# Patient Record
Sex: FEMALE | Race: BLACK OR AFRICAN AMERICAN | NOT HISPANIC OR LATINO | Employment: OTHER | URBAN - METROPOLITAN AREA
[De-identification: names, ages, dates, MRNs, and addresses within clinical notes are randomized per-mention and may not be internally consistent; named-entity substitution may affect disease eponyms.]

---

## 2023-03-16 PROBLEM — M54.50 CHRONIC RIGHT-SIDED LOW BACK PAIN WITHOUT SCIATICA: Status: ACTIVE | Noted: 2023-03-16

## 2023-03-16 PROBLEM — N89.8 VAGINAL DISCHARGE: Status: ACTIVE | Noted: 2023-03-16

## 2023-03-16 PROBLEM — R73.09 ELEVATED HEMOGLOBIN A1C: Status: ACTIVE | Noted: 2023-03-16

## 2023-03-16 PROBLEM — M51.16 DISPLACEMENT OF LUMBAR INTERVERTEBRAL DISC WITH RADICULOPATHY: Status: ACTIVE | Noted: 2023-03-16

## 2023-03-16 PROBLEM — G89.29 CHRONIC RIGHT-SIDED LOW BACK PAIN WITHOUT SCIATICA: Status: ACTIVE | Noted: 2023-03-16

## 2023-03-16 PROBLEM — G89.29 CHRONIC LOW BACK PAIN: Status: ACTIVE | Noted: 2023-03-16

## 2023-03-16 PROBLEM — E55.9 VITAMIN D DEFICIENCY: Status: ACTIVE | Noted: 2023-03-16

## 2023-03-16 PROBLEM — L73.2 HIDRADENITIS SUPPURATIVA: Status: ACTIVE | Noted: 2023-03-16

## 2023-03-16 PROBLEM — E66.01 OBESITY, CLASS III, BMI 40-49.9 (MORBID OBESITY) (MULTI): Status: ACTIVE | Noted: 2023-03-16

## 2023-03-16 PROBLEM — M54.50 LUMBAR SPINE PAIN: Status: ACTIVE | Noted: 2023-03-16

## 2023-03-16 PROBLEM — Z98.891 HISTORY OF 2 CESAREAN SECTIONS: Status: ACTIVE | Noted: 2023-03-16

## 2023-03-16 PROBLEM — M54.59 OTHER LOW BACK PAIN: Status: ACTIVE | Noted: 2023-03-16

## 2023-03-16 PROBLEM — E66.813 OBESITY, CLASS III, BMI 40-49.9 (MORBID OBESITY): Status: ACTIVE | Noted: 2023-03-16

## 2023-03-16 PROBLEM — E78.5 HYPERLIPIDEMIA: Status: ACTIVE | Noted: 2023-03-16

## 2023-03-16 PROBLEM — N63.0 BREAST LUMP IN FEMALE: Status: ACTIVE | Noted: 2023-03-16

## 2023-03-16 PROBLEM — M54.2 CERVICAL SPINE PAIN: Status: ACTIVE | Noted: 2023-03-16

## 2023-03-16 PROBLEM — M54.50 CHRONIC LOW BACK PAIN: Status: ACTIVE | Noted: 2023-03-16

## 2023-03-16 PROBLEM — Z04.9 CONDITION NOT FOUND: Status: ACTIVE | Noted: 2023-03-16

## 2023-03-16 PROBLEM — M25.561 RIGHT KNEE PAIN: Status: ACTIVE | Noted: 2023-03-16

## 2023-03-16 PROBLEM — O34.219 UTERINE SCAR FROM PREVIOUS CESAREAN DELIVERY COMPLICATING PREGNANCY (HHS-HCC): Status: ACTIVE | Noted: 2023-03-16

## 2023-03-16 PROBLEM — N92.6 MISSED MENSES: Status: ACTIVE | Noted: 2023-03-16

## 2023-03-16 PROBLEM — O09.40 HIGH RISK MULTIGRAVIDA (HHS-HCC): Status: ACTIVE | Noted: 2023-03-16

## 2023-03-16 PROBLEM — R39.9 SYMPTOMS INVOLVING URINARY SYSTEM: Status: ACTIVE | Noted: 2023-03-16

## 2023-03-16 PROBLEM — N89.8 VAGINAL ODOR: Status: ACTIVE | Noted: 2023-03-16

## 2023-03-16 PROBLEM — M54.6 THORACIC SPINE PAIN: Status: ACTIVE | Noted: 2023-03-16

## 2023-03-16 RX ORDER — CLOTRIMAZOLE AND BETAMETHASONE DIPROPIONATE 10; .64 MG/G; MG/G
CREAM TOPICAL 2 TIMES DAILY
COMMUNITY
Start: 2022-04-28 | End: 2023-10-03 | Stop reason: ALTCHOICE

## 2023-03-16 RX ORDER — IBUPROFEN 800 MG/1
1 TABLET ORAL 3 TIMES DAILY PRN
COMMUNITY
Start: 2022-09-30 | End: 2024-06-03 | Stop reason: SDUPTHER

## 2023-03-16 RX ORDER — GABAPENTIN 300 MG/1
1 CAPSULE ORAL 2 TIMES DAILY
COMMUNITY
Start: 2022-08-08 | End: 2023-03-20 | Stop reason: SDUPTHER

## 2023-03-16 RX ORDER — FUROSEMIDE 20 MG/1
1 TABLET ORAL 2 TIMES DAILY
COMMUNITY
Start: 2022-04-08 | End: 2023-10-03 | Stop reason: ALTCHOICE

## 2023-03-16 RX ORDER — PNV NO.95/FERROUS FUM/FOLIC AC 28MG-0.8MG
1 TABLET ORAL DAILY
COMMUNITY
Start: 2021-08-27 | End: 2023-10-30 | Stop reason: ALTCHOICE

## 2023-03-16 RX ORDER — ERGOCALCIFEROL 1.25 MG/1
50000 CAPSULE ORAL
COMMUNITY
Start: 2021-09-28 | End: 2023-10-03 | Stop reason: SDUPTHER

## 2023-03-16 RX ORDER — NORETHINDRONE 0.35 MG/1
1 TABLET ORAL DAILY
COMMUNITY
Start: 2022-04-08 | End: 2023-10-30 | Stop reason: ALTCHOICE

## 2023-03-20 ENCOUNTER — OFFICE VISIT (OUTPATIENT)
Dept: PRIMARY CARE | Facility: CLINIC | Age: 28
End: 2023-03-20
Payer: COMMERCIAL

## 2023-03-20 VITALS
HEIGHT: 69 IN | HEART RATE: 70 BPM | WEIGHT: 293 LBS | SYSTOLIC BLOOD PRESSURE: 114 MMHG | BODY MASS INDEX: 43.4 KG/M2 | DIASTOLIC BLOOD PRESSURE: 75 MMHG

## 2023-03-20 DIAGNOSIS — G89.29 CHRONIC BILATERAL LOW BACK PAIN WITHOUT SCIATICA: ICD-10-CM

## 2023-03-20 DIAGNOSIS — M54.50 CHRONIC BILATERAL LOW BACK PAIN WITHOUT SCIATICA: ICD-10-CM

## 2023-03-20 DIAGNOSIS — E66.01 OBESITY, CLASS III, BMI 40-49.9 (MORBID OBESITY) (MULTI): Primary | ICD-10-CM

## 2023-03-20 PROCEDURE — 84436 ASSAY OF TOTAL THYROXINE: CPT

## 2023-03-20 PROCEDURE — 99214 OFFICE O/P EST MOD 30 MIN: CPT | Performed by: NURSE PRACTITIONER

## 2023-03-20 PROCEDURE — 80349 CANNABINOIDS NATURAL: CPT

## 2023-03-20 PROCEDURE — 80368 SEDATIVE HYPNOTICS: CPT

## 2023-03-20 PROCEDURE — 86376 MICROSOMAL ANTIBODY EACH: CPT

## 2023-03-20 PROCEDURE — 36415 COLL VENOUS BLD VENIPUNCTURE: CPT | Performed by: NURSE PRACTITIONER

## 2023-03-20 PROCEDURE — 80358 DRUG SCREENING METHADONE: CPT

## 2023-03-20 PROCEDURE — 80361 OPIATES 1 OR MORE: CPT

## 2023-03-20 PROCEDURE — 80354 DRUG SCREENING FENTANYL: CPT

## 2023-03-20 PROCEDURE — 84443 ASSAY THYROID STIM HORMONE: CPT

## 2023-03-20 PROCEDURE — 80373 DRUG SCREENING TRAMADOL: CPT

## 2023-03-20 PROCEDURE — 80346 BENZODIAZEPINES1-12: CPT

## 2023-03-20 PROCEDURE — 80307 DRUG TEST PRSMV CHEM ANLYZR: CPT

## 2023-03-20 PROCEDURE — 1036F TOBACCO NON-USER: CPT | Performed by: NURSE PRACTITIONER

## 2023-03-20 PROCEDURE — 80365 DRUG SCREENING OXYCODONE: CPT

## 2023-03-20 RX ORDER — PHENTERMINE HYDROCHLORIDE 37.5 MG/1
37.5 CAPSULE ORAL
Qty: 30 CAPSULE | Refills: 0 | Status: SHIPPED | OUTPATIENT
Start: 2023-03-20 | End: 2023-10-30 | Stop reason: ALTCHOICE

## 2023-03-20 RX ORDER — TIZANIDINE 2 MG/1
2 TABLET ORAL NIGHTLY PRN
Qty: 20 TABLET | Refills: 0 | Status: SHIPPED | OUTPATIENT
Start: 2023-03-20 | End: 2023-10-30 | Stop reason: ALTCHOICE

## 2023-03-20 RX ORDER — TIZANIDINE HYDROCHLORIDE 2 MG/1
2 CAPSULE, GELATIN COATED ORAL NIGHTLY PRN
Qty: 20 CAPSULE | Refills: 0 | Status: SHIPPED | OUTPATIENT
Start: 2023-03-20 | End: 2023-03-20 | Stop reason: CLARIF

## 2023-03-20 RX ORDER — METHYLPREDNISOLONE 4 MG/1
TABLET ORAL
Qty: 21 TABLET | Refills: 0 | Status: SHIPPED | OUTPATIENT
Start: 2023-03-20 | End: 2023-03-27

## 2023-03-20 RX ORDER — GABAPENTIN 300 MG/1
300 CAPSULE ORAL 2 TIMES DAILY
Qty: 60 CAPSULE | Refills: 0 | Status: SHIPPED | OUTPATIENT
Start: 2023-03-20 | End: 2023-10-30 | Stop reason: ALTCHOICE

## 2023-03-20 NOTE — PROGRESS NOTES
"Subjective   Patient ID: Erza Cox is a 27 y.o. female who presents for Weight Loss (Pt complains of left flank pain from standing long periods of time ).    Ezra is a 27 year old female who presents to the office today for several concerns. She has been having pain in her back and left side. Denied dysuria or urinary frequency. No history of kidney stones. Has had chronic back pain. Needs [place] paperwork completed to continue lift restrictions. Has seen chiropractor for back pain.     Changed diet and unable to lose weight. Frustrated that her TSH wasn't drawn in January. TSH drawn in July, 2022 (result normal).          Review of Systems   All other systems reviewed and are negative.      Objective   /75   Pulse 70   Ht 1.753 m (5' 9\")   Wt (!) 152 kg (336 lb)   BMI 49.62 kg/m²     Physical Exam  Constitutional:       Appearance: Normal appearance.   HENT:      Head: Normocephalic and atraumatic.      Right Ear: Tympanic membrane normal.      Left Ear: Tympanic membrane normal.      Nose: Nose normal.      Mouth/Throat:      Mouth: Mucous membranes are moist.      Pharynx: Oropharynx is clear.   Eyes:      Extraocular Movements: Extraocular movements intact.      Conjunctiva/sclera: Conjunctivae normal.      Pupils: Pupils are equal, round, and reactive to light.   Cardiovascular:      Rate and Rhythm: Normal rate and regular rhythm.      Pulses: Normal pulses.      Heart sounds: Normal heart sounds.   Pulmonary:      Effort: Pulmonary effort is normal.      Breath sounds: Normal breath sounds.   Abdominal:      General: Abdomen is flat. Bowel sounds are normal.      Palpations: Abdomen is soft.   Genitourinary:     General: Normal vulva.   Musculoskeletal:      Cervical back: Normal range of motion and neck supple.      Comments: Decreased ROM lower spine with flexion and extension.   Skin:     General: Skin is warm and dry.      Capillary Refill: Capillary refill takes less than 2 seconds. "   Neurological:      General: No focal deficit present.      Mental Status: She is alert and oriented to person, place, and time. Mental status is at baseline.   Psychiatric:         Mood and Affect: Mood normal.         Behavior: Behavior normal.         Thought Content: Thought content normal.         Judgment: Judgment normal.         Assessment/Plan   Problem List Items Addressed This Visit          Endocrine/Metabolic    Obesity, Class III, BMI 40-49.9 (morbid obesity) (CMS/[facility]) - Primary    Relevant Medications    phentermine 37.5 mg capsule    Other Relevant Orders    Thyroid Peroxidase (TPO) Antibody (Completed)    TSH (Completed)    Thyroxine, Total (Completed)    Referral to Nutrition Services    Opiate/Opioid/Benzo Extended Prescription Compliance       Other    Chronic low back pain    Relevant Medications    methylPREDNISolone (Medrol Dospak) 4 mg tablets    gabapentin (Neurontin) 300 mg capsule    tiZANidine (Zanaflex) 2 mg tablet    Other Relevant Orders    Referral to Pain Medicine   Obesity: counseled on weight loss to improve health, cardiovascular health, decrease risk of diabetes and decrease wear and tear on weight bearing joints which will alleviate joint pain. Encouraged exercising such as starting to walk; 15-30 minutes daily, increasing goal of exercise to >150 minutes/week with emphasis on cardio, strengthening and toning. Discussed benefits of gym membership and/or . Cut back on daily calories to [id number] kcal diet. Watch daily carbs such as breads, pasta, rice, starchy vegetables and sweets. Started on Phentermine. Counseled on side effects of Phentermine: heart palpitations, elevated BP, insomnia, dizziness, dry mouth, headache or GI symptoms with Phentermine. OARRS checked. Counseled on filling script within 7 days of date script written to avoid problems with [place] [employer] bylaws on Phentermine. Follow-up in one month to recheck BP and weight.    Low back pain:  recommend contacting pain management for re-evaluation. You were prescribed Medrol dose cristina along with muscle relaxant. Avoid heavy lifting. [facility] paperwork will be completed.

## 2023-03-21 LAB
THYROPEROXIDASE AB (IU/ML) IN SER/PLAS: <28 IU/ML
THYROTROPIN (MIU/L) IN SER/PLAS BY DETECTION LIMIT <= 0.05 MIU/L: 0.69 MIU/L (ref 0.44–3.98)
THYROXINE (T4) (UG/DL) IN SER/PLAS: 7.4 UG/DL (ref 4.5–11.1)

## 2023-03-23 LAB
6-ACETYLMORPHINE: <25 NG/ML
7-AMINOCLONAZEPAM: <25 NG/ML
ALPHA-HYDROXYALPRAZOLAM: <25 NG/ML
ALPHA-HYDROXYMIDAZOLAM: <25 NG/ML
ALPRAZOLAM: <25 NG/ML
AMPHETAMINE (PRESENCE) IN URINE BY SCREEN METHOD: ABNORMAL
BARBITURATES PRESENCE IN URINE BY SCREEN METHOD: ABNORMAL
CANNABINOIDS IN URINE BY SCREEN METHOD: ABNORMAL
CHLORDIAZEPOXIDE: <25 NG/ML
CLONAZEPAM: <25 NG/ML
COCAINE (PRESENCE) IN URINE BY SCREEN METHOD: ABNORMAL
CODEINE: <50 NG/ML
CREATINE, URINE FOR DRUG: 185.4 MG/DL
DIAZEPAM: <25 NG/ML
DRUG SCREEN COMMENT URINE: ABNORMAL
EDDP: <25 NG/ML
FENTANYL CONFIRMATION, URINE: <2.5 NG/ML
HYDROCODONE: <25 NG/ML
HYDROMORPHONE: <25 NG/ML
LORAZEPAM: <25 NG/ML
METHADONE CONFIRMATION,URINE: <25 NG/ML
MIDAZOLAM: <25 NG/ML
MORPHINE URINE: <50 NG/ML
NORDIAZEPAM: <25 NG/ML
NORFENTANYL: <2.5 NG/ML
NORHYDROCODONE: <25 NG/ML
NOROXYCODONE: <25 NG/ML
O-DESMETHYLTRAMADOL: <50 NG/ML
OXAZEPAM: <25 NG/ML
OXYCODONE: <25 NG/ML
OXYMORPHONE: <25 NG/ML
PHENCYCLIDINE (PRESENCE) IN URINE BY SCREEN METHOD: ABNORMAL
TEMAZEPAM: <25 NG/ML
TRAMADOL: <50 NG/ML
ZOLPIDEM METABOLITE (ZCA): <25 NG/ML
ZOLPIDEM: <25 NG/ML

## 2023-03-23 RX ORDER — TIZANIDINE HYDROCHLORIDE 2 MG/1
2 CAPSULE, GELATIN COATED ORAL NIGHTLY PRN
Qty: 20 CAPSULE | Refills: 0 | OUTPATIENT
Start: 2023-03-23 | End: 2023-04-12

## 2023-03-25 ENCOUNTER — DOCUMENTATION (OUTPATIENT)
Dept: PRIMARY CARE | Facility: CLINIC | Age: 28
End: 2023-03-25
Payer: COMMERCIAL

## 2023-03-25 LAB — 11-NOR-9-CARBOXY-THC, URN, QUANT: 35 NG/ML

## 2023-03-25 NOTE — PROGRESS NOTES
Discussed patient's positive drug screen with her via telephone. She admitted that she takes gummies with THC.

## 2023-05-02 LAB — URINE CULTURE: NORMAL

## 2023-07-10 ENCOUNTER — TELEPHONE (OUTPATIENT)
Dept: PRIMARY CARE | Facility: CLINIC | Age: 28
End: 2023-07-10
Payer: COMMERCIAL

## 2023-08-08 LAB — URINE CULTURE: NORMAL

## 2023-10-01 PROBLEM — L81.0 POSTINFLAMMATORY HYPERPIGMENTATION: Status: ACTIVE | Noted: 2021-02-01

## 2023-10-01 PROBLEM — R29.818 SUSPECTED SLEEP APNEA: Status: ACTIVE | Noted: 2023-10-01

## 2023-10-01 PROBLEM — L70.0 ACNE VULGARIS: Status: ACTIVE | Noted: 2021-02-01

## 2023-10-01 PROBLEM — L57.9 SKIN CHANGES DUE TO CHRONIC EXPOSURE TO NONIONIZING RADIATION, UNSPECIFIED: Status: ACTIVE | Noted: 2021-02-01

## 2023-10-01 PROBLEM — Z55.6 DIFFICULTY DEMONSTRATING HEALTH LITERACY: Status: ACTIVE | Noted: 2023-10-01

## 2023-10-03 ENCOUNTER — OFFICE VISIT (OUTPATIENT)
Dept: PRIMARY CARE | Facility: CLINIC | Age: 28
End: 2023-10-03
Payer: COMMERCIAL

## 2023-10-03 VITALS — HEIGHT: 69 IN | BODY MASS INDEX: 43.4 KG/M2 | WEIGHT: 293 LBS

## 2023-10-03 DIAGNOSIS — M54.50 LUMBAR SPINE PAIN: Primary | ICD-10-CM

## 2023-10-03 DIAGNOSIS — E55.9 VITAMIN D DEFICIENCY: ICD-10-CM

## 2023-10-03 PROCEDURE — 1036F TOBACCO NON-USER: CPT | Performed by: FAMILY MEDICINE

## 2023-10-03 PROCEDURE — 99214 OFFICE O/P EST MOD 30 MIN: CPT | Performed by: FAMILY MEDICINE

## 2023-10-03 ASSESSMENT — ENCOUNTER SYMPTOMS
LOSS OF SENSATION IN FEET: 0
DEPRESSION: 0
OCCASIONAL FEELINGS OF UNSTEADINESS: 0

## 2023-10-03 ASSESSMENT — PATIENT HEALTH QUESTIONNAIRE - PHQ9
1. LITTLE INTEREST OR PLEASURE IN DOING THINGS: NOT AT ALL
2. FEELING DOWN, DEPRESSED OR HOPELESS: NOT AT ALL
SUM OF ALL RESPONSES TO PHQ9 QUESTIONS 1 AND 2: 0

## 2023-10-07 RX ORDER — ERGOCALCIFEROL 1.25 MG/1
50000 CAPSULE ORAL
Qty: 90 CAPSULE | Refills: 1 | Status: SHIPPED | OUTPATIENT
Start: 2023-10-07 | End: 2023-10-30 | Stop reason: SDUPTHER

## 2023-10-07 ASSESSMENT — ENCOUNTER SYMPTOMS
EYES NEGATIVE: 1
RESPIRATORY NEGATIVE: 1
PSYCHIATRIC NEGATIVE: 1
CONSTITUTIONAL NEGATIVE: 1
ENDOCRINE NEGATIVE: 1
CARDIOVASCULAR NEGATIVE: 1
MUSCULOSKELETAL NEGATIVE: 1
GASTROINTESTINAL NEGATIVE: 1
NEUROLOGICAL NEGATIVE: 1

## 2023-10-07 NOTE — ASSESSMENT & PLAN NOTE
Patient has been off work since March unclear what kind of care patient has had for her back pain since then did not return for follow-up to this office is not taking any medication now states she is ready to return to work was supposed to return to work on September 20 unclear why she did not schedule an appointment to see her PCP prior to the return to work date this provider is not comfortable completing any FMLA related documentation for the patient as this provider has not been involved in her care in the past and there are no records available to support her pain over the last 6 months patient was advised that this provider is not able to complete her forms.  35 minutes spent reviewing notes forms discussing with staff and discussing with patient

## 2023-10-07 NOTE — PROGRESS NOTES
Subjective   Patient ID: Ezra Cox is a 28 y.o. female who presents for No chief complaint on file..      HPI patient is here to get forms filled out for [employer] has been off work since March and was given time off from March to September 20 for back pain with the plan to follow-up did not return for follow-up during that time.  Has not returned to work as planned on September 20 wants forms completed to state that she was allowed to be off work from the 20th to today wants to return to work now.  Was started on phentermine in March did not return for follow-up has not been seen pain management or doing therapy  Current Outpatient Medications on File Prior to Visit   Medication Sig Dispense Refill    gabapentin (Neurontin) 300 mg capsule Take 1 capsule (300 mg) by mouth in the morning and 1 capsule (300 mg) before bedtime. 60 capsule 0    ibuprofen 800 mg tablet Take 1 tablet (800 mg) by mouth 3 times a day as needed.      norethindrone (Micronor) 0.35 mg tablet Take 1 tablet (0.35 mg) by mouth once daily.      phentermine 37.5 mg capsule Take 1 capsule (37.5 mg) by mouth once daily in the morning. Take before meals. 30 capsule 0    prenatal vit no.124-iron-folic (Prenatal Vitamin) 27 mg iron- 800 mcg tablet Take 1 tablet by mouth once daily.      tiZANidine (Zanaflex) 2 mg tablet Take 1 tablet (2 mg) by mouth as needed at bedtime for muscle spasms for up to 10 days. 20 tablet 0    [DISCONTINUED] clotrimazole-betamethasone (Lotrisone) cream twice a day. APPLY SPARINGLY TO THE AFFECTED AREA(S)      [DISCONTINUED] ergocalciferol (Vitamin D-2) 1.25 MG (37908 UT) capsule Take 1 capsule (50,000 Units) by mouth 1 (one) time per week. for 6 months. Then switch to OTC vitamin D 2,000 untzane adhikari.      [DISCONTINUED] furosemide (Lasix) 20 mg tablet Take 1 tablet (20 mg) by mouth 2 times a day.       No current facility-administered medications on file prior to visit.        Review of Systems   Constitutional: Negative.   "  HENT: Negative.     Eyes: Negative.    Respiratory: Negative.     Cardiovascular: Negative.    Gastrointestinal: Negative.    Endocrine: Negative.    Genitourinary: Negative.    Musculoskeletal: Negative.    Neurological: Negative.    Psychiatric/Behavioral: Negative.         Objective   Height 1.753 m (5' 9\")   Weight 145 kg (319 lb)   Body Mass Index 47.11 kg/m²   BSA: 2.66 meters squared  Growth percentiles: Facility age limit for growth %jerry is 20 years. Facility age limit for growth %jerry is 20 years.   No visits with results within 1 Week(s) from this visit.   Latest known visit with results is:   Orders Only on 08/07/2023   Component Date Value Ref Range Status    Urine Culture 08/07/2023 NO SIGNIFICANT GROWTH.   Final      Physical Exam  Constitutional:       General: She is not in acute distress.     Appearance: She is obese. She is not ill-appearing.   Neurological:      Mental Status: She is alert.         Assessment/Plan   Problem List Items Addressed This Visit             ICD-10-CM    Lumbar spine pain - Primary M54.50     Patient has been off work since March unclear what kind of care patient has had for her back pain since then did not return for follow-up to this office is not taking any medication now states she is ready to return to work was supposed to return to work on September 20 unclear why she did not schedule an appointment to see her PCP prior to the return to work date this provider is not comfortable completing any FMLA related documentation for the patient as this provider has not been involved in her care in the past and there are no records available to support her pain over the last 6 months patient was advised that this provider is not able to complete her forms.  35 minutes spent reviewing notes forms discussing with staff and discussing with patient         Vitamin D deficiency E55.9    Relevant Medications    ergocalciferol (Vitamin D-2) 1.25 MG (02265 UT) capsule            "

## 2023-10-16 ENCOUNTER — APPOINTMENT (OUTPATIENT)
Dept: OBSTETRICS AND GYNECOLOGY | Facility: CLINIC | Age: 28
End: 2023-10-16
Payer: COMMERCIAL

## 2023-10-30 ENCOUNTER — OFFICE VISIT (OUTPATIENT)
Dept: OBSTETRICS AND GYNECOLOGY | Facility: CLINIC | Age: 28
End: 2023-10-30
Payer: COMMERCIAL

## 2023-10-30 VITALS
HEIGHT: 69 IN | SYSTOLIC BLOOD PRESSURE: 116 MMHG | DIASTOLIC BLOOD PRESSURE: 60 MMHG | BODY MASS INDEX: 43.4 KG/M2 | WEIGHT: 293 LBS

## 2023-10-30 DIAGNOSIS — Z01.419 ENCOUNTER FOR ANNUAL ROUTINE GYNECOLOGICAL EXAMINATION: ICD-10-CM

## 2023-10-30 DIAGNOSIS — E55.9 VITAMIN D DEFICIENCY: ICD-10-CM

## 2023-10-30 LAB
POC BLOOD, URINE: ABNORMAL
POC GLUCOSE, URINE: NEGATIVE MG/DL
POC KETONES, URINE: NEGATIVE MG/DL
POC LEUKOCYTES, URINE: NEGATIVE
POC NITRITE,URINE: NEGATIVE
POC PROTEIN, URINE: NEGATIVE MG/DL

## 2023-10-30 PROCEDURE — 1036F TOBACCO NON-USER: CPT | Performed by: OBSTETRICS & GYNECOLOGY

## 2023-10-30 PROCEDURE — 99395 PREV VISIT EST AGE 18-39: CPT | Performed by: OBSTETRICS & GYNECOLOGY

## 2023-10-30 PROCEDURE — 81003 URINALYSIS AUTO W/O SCOPE: CPT | Performed by: OBSTETRICS & GYNECOLOGY

## 2023-10-30 RX ORDER — ERGOCALCIFEROL 1.25 MG/1
50000 CAPSULE ORAL
Qty: 12 CAPSULE | Refills: 1 | Status: SHIPPED | OUTPATIENT
Start: 2023-10-30

## 2023-10-30 ASSESSMENT — PAIN SCALES - GENERAL: PAINLEVEL: 0-NO PAIN

## 2023-10-30 NOTE — PROGRESS NOTES
Subjective   Patient ID: Ezra Cox is a 28 y.o. female who presents for Gynecologic Exam (Annual exam, last pap 7/15/21 wnl. No chaperone needed).  Gynecologic Exam      Patient is a 28-year-old female  4 para 3 well-known to the practice here for her annual exam.    She does get occasional cyst on her inner thighs.  And rarely under her arms.  She was and told that she has suppurative hidradenitis.  No current flares at this time.    She denies any urinary or bowel symptoms.    She is rarely sexually active and is aware of emergency contraception.  Does not want contraception at this time.    She had a history of postpartum hemorrhage and we will check her CBC  Review of Systems   All other systems reviewed and are negative.      Objective   Physical Exam  Thyroid: No thyroid megaly    Cardiovascular: Regular rate and rhythm    Lungs: Clear to auscultation    Breasts: No skin changes no masses palpated    Abdomen: Soft nontender bowel sounds positive no masses palpated    Extremities nontender no edema    Pelvic exam: External genitalia [place]'s urethra and [place]'s are normal.  Vaginal exam shows no lesions or discharge.  Pelvic bimanual exam reveals no masses or tenderness.  Assessment/Plan   Normal annual exam.  No obvious fibroids.    Pap smear due next year    CBC to check anemia status since her postpartum hemorrhage over a year ago    Refill her vitamin D    Follow-up in 1 year or as needed

## 2024-01-03 ENCOUNTER — OFFICE VISIT (OUTPATIENT)
Dept: PRIMARY CARE | Facility: CLINIC | Age: 29
End: 2024-01-03
Payer: COMMERCIAL

## 2024-01-03 VITALS
BODY MASS INDEX: 43.4 KG/M2 | HEIGHT: 69 IN | WEIGHT: 293 LBS | SYSTOLIC BLOOD PRESSURE: 126 MMHG | DIASTOLIC BLOOD PRESSURE: 79 MMHG | HEART RATE: 64 BPM

## 2024-01-03 DIAGNOSIS — R09.81 NASAL CONGESTION: ICD-10-CM

## 2024-01-03 DIAGNOSIS — D64.9 LOW HEMOGLOBIN: ICD-10-CM

## 2024-01-03 DIAGNOSIS — L98.9 SKIN LESION OF BACK: ICD-10-CM

## 2024-01-03 DIAGNOSIS — Z00.00 ANNUAL PHYSICAL EXAM: Primary | ICD-10-CM

## 2024-01-03 DIAGNOSIS — E78.00 PURE HYPERCHOLESTEROLEMIA: ICD-10-CM

## 2024-01-03 DIAGNOSIS — J02.9 SORE THROAT: ICD-10-CM

## 2024-01-03 DIAGNOSIS — H65.192 ACUTE OTITIS MEDIA WITH EFFUSION OF LEFT EAR: ICD-10-CM

## 2024-01-03 DIAGNOSIS — E55.9 VITAMIN D DEFICIENCY: ICD-10-CM

## 2024-01-03 DIAGNOSIS — H61.21 IMPACTED CERUMEN OF RIGHT EAR: ICD-10-CM

## 2024-01-03 LAB
25(OH)D3 SERPL-MCNC: 26 NG/ML (ref 30–100)
ALBUMIN SERPL BCP-MCNC: 4.4 G/DL (ref 3.4–5)
ALP SERPL-CCNC: 68 U/L (ref 33–110)
ALT SERPL W P-5'-P-CCNC: 11 U/L (ref 7–45)
ANION GAP SERPL CALC-SCNC: 15 MMOL/L (ref 10–20)
AST SERPL W P-5'-P-CCNC: 18 U/L (ref 9–39)
BASOPHILS # BLD AUTO: 0.04 X10*3/UL (ref 0–0.1)
BASOPHILS NFR BLD AUTO: 0.5 %
BILIRUB SERPL-MCNC: 0.6 MG/DL (ref 0–1.2)
BUN SERPL-MCNC: 9 MG/DL (ref 6–23)
CALCIUM SERPL-MCNC: 9.6 MG/DL (ref 8.6–10.6)
CHLORIDE SERPL-SCNC: 103 MMOL/L (ref 98–107)
CHOLEST SERPL-MCNC: 257 MG/DL (ref 0–199)
CHOLESTEROL/HDL RATIO: 3
CO2 SERPL-SCNC: 24 MMOL/L (ref 21–32)
CREAT SERPL-MCNC: 0.78 MG/DL (ref 0.5–1.05)
EOSINOPHIL # BLD AUTO: 0.21 X10*3/UL (ref 0–0.7)
EOSINOPHIL NFR BLD AUTO: 2.8 %
ERYTHROCYTE [DISTWIDTH] IN BLOOD BY AUTOMATED COUNT: 17.3 % (ref 11.5–14.5)
GFR SERPL CREATININE-BSD FRML MDRD: >90 ML/MIN/1.73M*2
GLUCOSE SERPL-MCNC: 82 MG/DL (ref 74–99)
HCT VFR BLD AUTO: 37.6 % (ref 36–46)
HDLC SERPL-MCNC: 85 MG/DL
HGB BLD-MCNC: 11.3 G/DL (ref 12–16)
IMM GRANULOCYTES # BLD AUTO: 0.03 X10*3/UL (ref 0–0.7)
IMM GRANULOCYTES NFR BLD AUTO: 0.4 % (ref 0–0.9)
LDLC SERPL CALC-MCNC: 159 MG/DL
LYMPHOCYTES # BLD AUTO: 2.15 X10*3/UL (ref 1.2–4.8)
LYMPHOCYTES NFR BLD AUTO: 29.1 %
MCH RBC QN AUTO: 22.4 PG (ref 26–34)
MCHC RBC AUTO-ENTMCNC: 30.1 G/DL (ref 32–36)
MCV RBC AUTO: 75 FL (ref 80–100)
MONOCYTES # BLD AUTO: 0.64 X10*3/UL (ref 0.1–1)
MONOCYTES NFR BLD AUTO: 8.6 %
NEUTROPHILS # BLD AUTO: 4.33 X10*3/UL (ref 1.2–7.7)
NEUTROPHILS NFR BLD AUTO: 58.6 %
NON HDL CHOLESTEROL: 172 MG/DL (ref 0–149)
NRBC BLD-RTO: 0 /100 WBCS (ref 0–0)
PLATELET # BLD AUTO: 300 X10*3/UL (ref 150–450)
POC RAPID STREP: NEGATIVE
POTASSIUM SERPL-SCNC: 4 MMOL/L (ref 3.5–5.3)
PROT SERPL-MCNC: 7.9 G/DL (ref 6.4–8.2)
RBC # BLD AUTO: 5.05 X10*6/UL (ref 4–5.2)
SODIUM SERPL-SCNC: 138 MMOL/L (ref 136–145)
TRIGL SERPL-MCNC: 67 MG/DL (ref 0–149)
VLDL: 13 MG/DL (ref 0–40)
WBC # BLD AUTO: 7.4 X10*3/UL (ref 4.4–11.3)

## 2024-01-03 PROCEDURE — 99395 PREV VISIT EST AGE 18-39: CPT | Performed by: NURSE PRACTITIONER

## 2024-01-03 PROCEDURE — 36415 COLL VENOUS BLD VENIPUNCTURE: CPT

## 2024-01-03 PROCEDURE — 82306 VITAMIN D 25 HYDROXY: CPT

## 2024-01-03 PROCEDURE — 83540 ASSAY OF IRON: CPT

## 2024-01-03 PROCEDURE — 1036F TOBACCO NON-USER: CPT | Performed by: NURSE PRACTITIONER

## 2024-01-03 PROCEDURE — 99214 OFFICE O/P EST MOD 30 MIN: CPT | Performed by: NURSE PRACTITIONER

## 2024-01-03 PROCEDURE — 82728 ASSAY OF FERRITIN: CPT

## 2024-01-03 PROCEDURE — 80061 LIPID PANEL: CPT

## 2024-01-03 PROCEDURE — 83550 IRON BINDING TEST: CPT

## 2024-01-03 PROCEDURE — 87880 STREP A ASSAY W/OPTIC: CPT | Performed by: NURSE PRACTITIONER

## 2024-01-03 PROCEDURE — 80053 COMPREHEN METABOLIC PANEL: CPT

## 2024-01-03 PROCEDURE — 69210 REMOVE IMPACTED EAR WAX UNI: CPT | Performed by: NURSE PRACTITIONER

## 2024-01-03 PROCEDURE — 85025 COMPLETE CBC W/AUTO DIFF WBC: CPT

## 2024-01-03 PROCEDURE — 87637 SARSCOV2&INF A&B&RSV AMP PRB: CPT

## 2024-01-03 RX ORDER — CEFDINIR 300 MG/1
300 CAPSULE ORAL 2 TIMES DAILY
Qty: 14 CAPSULE | Refills: 0 | Status: SHIPPED | OUTPATIENT
Start: 2024-01-03 | End: 2024-01-10

## 2024-01-03 ASSESSMENT — PATIENT HEALTH QUESTIONNAIRE - PHQ9
2. FEELING DOWN, DEPRESSED OR HOPELESS: NOT AT ALL
SUM OF ALL RESPONSES TO PHQ9 QUESTIONS 1 AND 2: 0
1. LITTLE INTEREST OR PLEASURE IN DOING THINGS: NOT AT ALL

## 2024-01-03 ASSESSMENT — ENCOUNTER SYMPTOMS
DEPRESSION: 0
OCCASIONAL FEELINGS OF UNSTEADINESS: 0
LOSS OF SENSATION IN FEET: 0

## 2024-01-03 NOTE — PROGRESS NOTES
Reason for Visit: Annual Physical Exam    HPI: Ezra presents to the office today for a physical exam.    Left ear painful; throat sore. No fever, but has had chills. Has had pain in neck. Denied loss of hearing. No exposure to Covid, influenza or RSV.    Concern for skin lesion right back/bra line. Stated skin lesion has gotten larger. History of hidradenitis suppurativa.      Active Problem List  Patient Active Problem List   Diagnosis    Displacement of lumbar intervertebral disc with radiculopathy    Chronic low back pain    Chronic right-sided low back pain without sciatica    Hidradenitis suppurativa    Breast lump in female    Cervical spine pain    Elevated hemoglobin A1c    History of 2  sections    High risk multigravida    Hyperlipidemia    Missed menses    Lumbar spine pain    Condition not found    Obesity, Class III, BMI 40-49.9 (morbid obesity) (CMS/HCC)    Other low back pain    Right knee pain    Symptoms involving urinary system    Uterine scar from previous  delivery complicating pregnancy    Vaginal discharge    Thoracic spine pain    Vitamin D deficiency    Vaginal odor    Acne vulgaris    Difficulty demonstrating health literacy    Postinflammatory hyperpigmentation    Skin changes due to chronic exposure to nonionizing radiation, unspecified    Suspected sleep apnea       Comprehensive Medical/Surgical/Social/Family History  Past Medical History:   Diagnosis Date    Encounter for gynecological examination (general) (routine) without abnormal findings 2020    Well woman exam    Encounter for supervision of normal pregnancy, unspecified, second trimester 2021    Second trimester pregnancy    Personal history of urinary (tract) infections 10/03/2016    History of urinary tract infection    Unspecified abdominal pain 2018    Abdominal cramps     Past Surgical History:   Procedure Laterality Date     SECTION, CLASSIC  10/10/2016     Section  "    Social History     Social History Narrative    Not on file         Allergies and Medications  Patient has no known allergies.  Current Outpatient Medications on File Prior to Visit   Medication Sig Dispense Refill    ergocalciferol (Vitamin D-2) 1.25 MG (03512 UT) capsule Take 1 capsule (50,000 Units) by mouth 1 (one) time per week. for 6 months. Then switch to OTC vitamin D 2,000 untis dily. 12 capsule 1    ibuprofen 800 mg tablet Take 1 tablet (800 mg) by mouth 3 times a day as needed.       No current facility-administered medications on file prior to visit.         ROS otherwise negative aside from what was mentioned above in HPI.    Vitals  /79   Pulse 64   Ht 1.753 m (5' 9\")   Wt 149 kg (329 lb)   BMI 48.58 kg/m²   Body mass index is 48.58 kg/m².  Physical Exam  Gen: Alert, NAD  HEENT:  PERRLA, EOMI, conjunctiva and sclera normal in appearance. Left external ear canals with erythema. Left TM with hazy cone of light; Right ear with cerumen impaction. Oral cavity and posterior pharynx without lesions/exudate. Posterior pharynx with erythema.  Neck:  Supple with FROM; No masses/nodes palpable; Thyroid nontender and without nodules; No DOMITILA  Respiratory:  Lungs CTAB  Cardiovascular:  Heart RRR. No M/R/G. Peripheral pulses equal bilaterally  Abdomen:  Soft, nontender, BS present throughout; No R/G/R; No HSM or masses palpated  Extremities:  FROM all extremities; Muscle strength grossly normal with good tone  Neuro:  CN II-XII intact; Reflexes 2+/2+; Gross motor and sensory intact  Skin:  No suspicious lesions present. + skin lesion with discoloration to right mid back/flank. No tenderness. No discharge from lesion.  Breast: No masses, skin lesions or nipple discharges, no axillary lymphadenopathy    Assessment and Plan:  Problem List Items Addressed This Visit       Hyperlipidemia    Relevant Orders    Comprehensive Metabolic Panel (Completed)    Lipid Panel (Completed)    Vitamin D deficiency    " Relevant Orders    Vitamin D 25-Hydroxy,Total (for eval of Vitamin D levels) (Completed)     Other Visit Diagnoses       Annual physical exam    -  Primary    Skin lesion of back        Relevant Orders    Referral to Dermatology    Sore throat        Relevant Orders    POCT rapid strep A manually resulted (Completed)    CBC and Auto Differential (Completed)    Nasal congestion        Relevant Orders    Sars-CoV-2 and Influenza A/B PCR    RSV PCR    Acute otitis media with effusion of left ear        Relevant Medications    cefdinir (Omnicef) 300 mg capsule    Impacted cerumen of right ear            1) Sore throat: rapid strep negative. Recommend salt water gargles 3-4 times daily along with warm honey or lemon tea. May use Cepacol or Chloraseptic throat spray to soothe sore throat.    2) Left ear otitis media: you were prescribed Cefdinir for ear infection.     3) Right cerumen impaction: right ear flushed with warm soapy water. You tolerated well.     4) Hyperlipidemia: CMP and lipids ordered. Watch foods high in cholesterol (fried foods, fast food, processed meats, desserts such as cookies, cake, ice cream, pastries and other sweets). Some foods that are high in cholesterol are healthy additions to your diet (eggs, cheese, shellfish, pasture raised steak, organ meats such as heart, kidney and liver, sardines and full-fat yogurt.    5) Nasal congestion: nasopharyngeal swab collected for Covid, influenza and RSV.     6) Skin lesion to back: referral placed for dermatology for removal of skin lesion.

## 2024-01-04 DIAGNOSIS — E55.9 VITAMIN D DEFICIENCY: ICD-10-CM

## 2024-01-04 LAB
FERRITIN SERPL-MCNC: 12 NG/ML (ref 8–150)
FLUAV RNA RESP QL NAA+PROBE: NOT DETECTED
FLUBV RNA RESP QL NAA+PROBE: NOT DETECTED
IRON SATN MFR SERPL: ABNORMAL %
IRON SERPL-MCNC: 34 UG/DL (ref 35–150)
RSV RNA RESP QL NAA+PROBE: NOT DETECTED
SARS-COV-2 ORF1AB RESP QL NAA+PROBE: NOT DETECTED
TIBC SERPL-MCNC: ABNORMAL UG/DL
UIBC SERPL-MCNC: >450 UG/DL (ref 110–370)

## 2024-01-04 RX ORDER — ACETAMINOPHEN 500 MG
2000 TABLET ORAL DAILY
Qty: 90 CAPSULE | Refills: 1 | Status: SHIPPED | OUTPATIENT
Start: 2024-01-04

## 2024-01-05 DIAGNOSIS — D64.9 LOW HEMOGLOBIN: ICD-10-CM

## 2024-01-05 DIAGNOSIS — E61.1 LOW IRON: ICD-10-CM

## 2024-01-05 RX ORDER — FERROUS SULFATE 325(65) MG
65 TABLET ORAL 2 TIMES DAILY
Qty: 180 TABLET | Refills: 1 | Status: SHIPPED | OUTPATIENT
Start: 2024-01-05 | End: 2024-07-03

## 2024-03-11 ENCOUNTER — OFFICE VISIT (OUTPATIENT)
Dept: OPHTHALMOLOGY | Facility: CLINIC | Age: 29
End: 2024-03-11
Payer: COMMERCIAL

## 2024-03-11 DIAGNOSIS — H52.223 REGULAR ASTIGMATISM OF BOTH EYES: ICD-10-CM

## 2024-03-11 DIAGNOSIS — H52.13 MYOPIA OF BOTH EYES: Primary | ICD-10-CM

## 2024-03-11 PROCEDURE — 92004 COMPRE OPH EXAM NEW PT 1/>: CPT | Performed by: OPTOMETRIST

## 2024-03-11 PROCEDURE — 92015 DETERMINE REFRACTIVE STATE: CPT | Performed by: OPTOMETRIST

## 2024-03-11 ASSESSMENT — ENCOUNTER SYMPTOMS
GASTROINTESTINAL NEGATIVE: 0
PSYCHIATRIC NEGATIVE: 0
CONSTITUTIONAL NEGATIVE: 0
EYES NEGATIVE: 1
MUSCULOSKELETAL NEGATIVE: 0
ENDOCRINE NEGATIVE: 0
HEMATOLOGIC/LYMPHATIC NEGATIVE: 0
CARDIOVASCULAR NEGATIVE: 0
NEUROLOGICAL NEGATIVE: 0
RESPIRATORY NEGATIVE: 0
ALLERGIC/IMMUNOLOGIC NEGATIVE: 0

## 2024-03-11 ASSESSMENT — REFRACTION_MANIFEST
OS_AXIS: 172
OD_SPHERE: -2.00
OD_AXIS: 180
OD_AXIS: 177
OS_SPHERE: -2.75
OS_AXIS: 015
METHOD_AUTOREFRACTION: 1
OS_CYLINDER: -0.50
OS_CYLINDER: -0.25
OD_CYLINDER: -0.50
OD_SPHERE: -2.00
OD_CYLINDER: -0.50
OS_SPHERE: -2.75

## 2024-03-11 ASSESSMENT — CONF VISUAL FIELD
OS_SUPERIOR_NASAL_RESTRICTION: 0
OS_NORMAL: 1
OD_SUPERIOR_TEMPORAL_RESTRICTION: 0
OD_INFERIOR_TEMPORAL_RESTRICTION: 0
METHOD: COUNTING FINGERS
OS_SUPERIOR_TEMPORAL_RESTRICTION: 0
OD_INFERIOR_NASAL_RESTRICTION: 0
OS_INFERIOR_NASAL_RESTRICTION: 0
OD_SUPERIOR_NASAL_RESTRICTION: 0
OD_NORMAL: 1
OS_INFERIOR_TEMPORAL_RESTRICTION: 0

## 2024-03-11 ASSESSMENT — VISUAL ACUITY
METHOD: SNELLEN - LINEAR
OD_SC+: -1
OD_SC: 20/80
OS_PH_SC+: -1
OS_PH_SC: 20/20
OS_SC+: -1
OS_SC: 20/80
OD_PH_SC: 20/20

## 2024-03-11 ASSESSMENT — TONOMETRY
IOP_METHOD: GOLDMANN APPLANATION
OS_IOP_MMHG: 18
OD_IOP_MMHG: 18

## 2024-03-11 ASSESSMENT — REFRACTION
OD_CYLINDER: -0.50
OS_AXIS: 010
OD_AXIS: 180
OS_CYLINDER: -0.25
OD_SPHERE: -2.00
OS_SPHERE: -2.50

## 2024-03-11 ASSESSMENT — SLIT LAMP EXAM - LIDS
COMMENTS: NORMAL
COMMENTS: NORMAL

## 2024-03-11 ASSESSMENT — CUP TO DISC RATIO
OS_RATIO: 0.5
OD_RATIO: 0.5

## 2024-03-11 ASSESSMENT — EXTERNAL EXAM - RIGHT EYE: OD_EXAM: NORMAL

## 2024-03-11 ASSESSMENT — EXTERNAL EXAM - LEFT EYE: OS_EXAM: NORMAL

## 2024-03-11 NOTE — PROGRESS NOTES
Assessment/Plan   Diagnoses and all orders for this visit:  Myopia of both eyes  Regular astigmatism of both eyes  New spec rx released today per patient request. Ocular health wnl for age OU. Monitor 1 year or sooner prn. Refraction billed today.    Pt denies dx of prediabetes, was told recently that blood work was normal.

## 2024-05-20 ENCOUNTER — OFFICE VISIT (OUTPATIENT)
Dept: PRIMARY CARE | Facility: CLINIC | Age: 29
End: 2024-05-20
Payer: COMMERCIAL

## 2024-05-20 VITALS
HEART RATE: 76 BPM | WEIGHT: 293 LBS | BODY MASS INDEX: 43.4 KG/M2 | HEIGHT: 69 IN | SYSTOLIC BLOOD PRESSURE: 113 MMHG | DIASTOLIC BLOOD PRESSURE: 56 MMHG

## 2024-05-20 DIAGNOSIS — Z11.3 SCREENING FOR STD (SEXUALLY TRANSMITTED DISEASE): Primary | ICD-10-CM

## 2024-05-20 DIAGNOSIS — R35.0 URINARY FREQUENCY: ICD-10-CM

## 2024-05-20 LAB
POC APPEARANCE, URINE: CLEAR
POC BILIRUBIN, URINE: NEGATIVE
POC BLOOD, URINE: NEGATIVE
POC COLOR, URINE: YELLOW
POC GLUCOSE, URINE: NEGATIVE MG/DL
POC KETONES, URINE: NEGATIVE MG/DL
POC LEUKOCYTES, URINE: NEGATIVE
POC NITRITE,URINE: NEGATIVE
POC PH, URINE: 6.5 PH
POC PROTEIN, URINE: NEGATIVE MG/DL
POC SPECIFIC GRAVITY, URINE: 1.01
POC UROBILINOGEN, URINE: 0.2 EU/DL

## 2024-05-20 PROCEDURE — 87491 CHLMYD TRACH DNA AMP PROBE: CPT

## 2024-05-20 PROCEDURE — 81002 URINALYSIS NONAUTO W/O SCOPE: CPT | Performed by: NURSE PRACTITIONER

## 2024-05-20 PROCEDURE — 87591 N.GONORRHOEAE DNA AMP PROB: CPT

## 2024-05-20 PROCEDURE — 1036F TOBACCO NON-USER: CPT | Performed by: NURSE PRACTITIONER

## 2024-05-20 PROCEDURE — 99214 OFFICE O/P EST MOD 30 MIN: CPT | Performed by: NURSE PRACTITIONER

## 2024-05-20 PROCEDURE — 87661 TRICHOMONAS VAGINALIS AMPLIF: CPT

## 2024-05-20 ASSESSMENT — ENCOUNTER SYMPTOMS
DYSURIA: 0
VOMITING: 0
FLANK PAIN: 0
FREQUENCY: 1
LOSS OF SENSATION IN FEET: 0
OCCASIONAL FEELINGS OF UNSTEADINESS: 0
DEPRESSION: 0
HEMATURIA: 0
ABDOMINAL PAIN: 1
NAUSEA: 0
CONSTIPATION: 0
DIARRHEA: 0

## 2024-05-20 NOTE — PROGRESS NOTES
"Subjective   Patient ID: Ezra Cox is a 29 y.o. female who presents for UTI (Uti and std check ).    Ezra presents to the office today with concerns of lower abdominal discomfort along with  frequent urination. No hematuria or history of kidney stones. Has cramping sensation in pelvic region. LMP 5/11/2024. No nausea or vomiting.    Requesting STI screening. Stated she had gone through her boyfriend's phone and found \"some things she didn't like\". No vaginal discharge or vaginal odor.              Review of Systems   Gastrointestinal:  Positive for abdominal pain. Negative for constipation, diarrhea, nausea and vomiting.   Genitourinary:  Positive for frequency and pelvic pain. Negative for dysuria, flank pain and hematuria.   All other systems reviewed and are negative.      Objective   /56   Pulse 76   Ht 1.74 m (5' 8.5\")   Wt (!) 150 kg (331 lb)   BMI 49.60 kg/m²     Physical Exam  Constitutional:       Appearance: Normal appearance.   HENT:      Head: Normocephalic and atraumatic.      Right Ear: Tympanic membrane normal.      Left Ear: Tympanic membrane normal.      Nose: Nose normal.      Mouth/Throat:      Mouth: Mucous membranes are moist.      Pharynx: Oropharynx is clear.   Eyes:      Extraocular Movements: Extraocular movements intact.      Conjunctiva/sclera: Conjunctivae normal.      Pupils: Pupils are equal, round, and reactive to light.   Cardiovascular:      Rate and Rhythm: Normal rate and regular rhythm.      Pulses: Normal pulses.      Heart sounds: Normal heart sounds.   Pulmonary:      Effort: Pulmonary effort is normal.      Breath sounds: Normal breath sounds.   Abdominal:      General: Abdomen is flat. Bowel sounds are normal.      Palpations: Abdomen is soft.   Musculoskeletal:         General: Normal range of motion.      Cervical back: Normal range of motion and neck supple.   Skin:     General: Skin is warm and dry.      Capillary Refill: Capillary refill takes less " than 2 seconds.   Neurological:      General: No focal deficit present.      Mental Status: She is alert and oriented to person, place, and time. Mental status is at baseline.   Psychiatric:         Mood and Affect: Mood normal.         Behavior: Behavior normal.         Thought Content: Thought content normal.         Judgment: Judgment normal.         Assessment/Plan   Problem List Items Addressed This Visit    None  Visit Diagnoses         Codes    Screening for STD (sexually transmitted disease)    -  Primary Z11.3    Relevant Orders    C. Trachomatis / N. Gonorrhoeae, Amplified Detection    Trichomonas vaginalis, Nucleic Acid Detection    Urinary frequency     R35.0    Relevant Orders    POCT UA (nonautomated) manually resulted (Completed)        1) Urinary frequency: urinalysis benign. Make sure you are pushing water to flush kidneys.     2) STI screening: urine collected for GC, Chlamydia and Trichomonas. If STI screening negative and pelvic pain persists; consider pelvic/transvaginal ultrasound.

## 2024-05-21 ENCOUNTER — APPOINTMENT (OUTPATIENT)
Dept: OBSTETRICS AND GYNECOLOGY | Facility: CLINIC | Age: 29
End: 2024-05-21
Payer: COMMERCIAL

## 2024-05-21 LAB
C TRACH RRNA SPEC QL NAA+PROBE: NEGATIVE
N GONORRHOEA DNA SPEC QL PROBE+SIG AMP: NEGATIVE
T VAGINALIS RRNA SPEC QL NAA+PROBE: NEGATIVE

## 2024-06-03 ENCOUNTER — OFFICE VISIT (OUTPATIENT)
Dept: PRIMARY CARE | Facility: CLINIC | Age: 29
End: 2024-06-03
Payer: COMMERCIAL

## 2024-06-03 VITALS
WEIGHT: 293 LBS | SYSTOLIC BLOOD PRESSURE: 120 MMHG | DIASTOLIC BLOOD PRESSURE: 60 MMHG | HEIGHT: 69 IN | BODY MASS INDEX: 43.4 KG/M2 | HEART RATE: 73 BPM

## 2024-06-03 DIAGNOSIS — M51.16 DISPLACEMENT OF LUMBAR INTERVERTEBRAL DISC WITH RADICULOPATHY: ICD-10-CM

## 2024-06-03 DIAGNOSIS — M54.50 ACUTE LEFT-SIDED LOW BACK PAIN WITHOUT SCIATICA: Primary | ICD-10-CM

## 2024-06-03 PROCEDURE — 99214 OFFICE O/P EST MOD 30 MIN: CPT | Performed by: NURSE PRACTITIONER

## 2024-06-03 PROCEDURE — 1036F TOBACCO NON-USER: CPT | Performed by: NURSE PRACTITIONER

## 2024-06-03 RX ORDER — TIZANIDINE 4 MG/1
4 TABLET ORAL NIGHTLY PRN
Qty: 20 TABLET | Refills: 0 | Status: SHIPPED | OUTPATIENT
Start: 2024-06-03

## 2024-06-03 RX ORDER — PREDNISONE 10 MG/1
TABLET ORAL
Qty: 20 TABLET | Refills: 0 | Status: SHIPPED | OUTPATIENT
Start: 2024-06-03

## 2024-06-03 RX ORDER — IBUPROFEN 800 MG/1
800 TABLET ORAL 3 TIMES DAILY PRN
Qty: 60 TABLET | Refills: 1 | Status: SHIPPED | OUTPATIENT
Start: 2024-06-03

## 2024-06-03 RX ORDER — LIDOCAINE 50 MG/G
1 PATCH TOPICAL DAILY
Qty: 9 PATCH | Refills: 1 | Status: SHIPPED | OUTPATIENT
Start: 2024-06-03

## 2024-06-03 ASSESSMENT — PATIENT HEALTH QUESTIONNAIRE - PHQ9
SUM OF ALL RESPONSES TO PHQ9 QUESTIONS 1 AND 2: 0
2. FEELING DOWN, DEPRESSED OR HOPELESS: NOT AT ALL
1. LITTLE INTEREST OR PLEASURE IN DOING THINGS: NOT AT ALL

## 2024-06-03 ASSESSMENT — ENCOUNTER SYMPTOMS
BACK PAIN: 1
DEPRESSION: 0
LOSS OF SENSATION IN FEET: 0
OCCASIONAL FEELINGS OF UNSTEADINESS: 0

## 2024-06-03 NOTE — PROGRESS NOTES
"Subjective   Patient ID: Ezra Cox is a 29 y.o. female who presents for Back Pain (Feels like she pulled something Lt lower back she can't bend without being in pain ).    Ezra is a 29 year old female who presents to the office today with concerns of low back pain. Stated she was at work (works at [employer]) last Wednesday, bent over and as she raised up, had back pain. She denied lifting anything heavy. Stated she didn't lift anything. Denied any work related injury. Admitted she is unable to sleep during the night due to back pain. Stated she took PTO for Thursday and Friday. She has not returned to work due to back pain. Denied numbness, tingling or weakness in lower extremities. No bowel or bladder incontinence. Will need [facility] paperwork completed.         Review of Systems   Musculoskeletal:  Positive for back pain.   All other systems reviewed and are negative.      Objective   /60   Pulse 73   Ht 1.753 m (5' 9\")   Wt (!) 150 kg (331 lb 3.2 oz)   BMI 48.91 kg/m²     Physical Exam  Constitutional:       Appearance: Normal appearance.   HENT:      Head: Normocephalic and atraumatic.      Right Ear: Tympanic membrane normal.      Left Ear: Tympanic membrane normal.      Nose: Nose normal.      Mouth/Throat:      Mouth: Mucous membranes are moist.      Pharynx: Oropharynx is clear.   Eyes:      Extraocular Movements: Extraocular movements intact.      Conjunctiva/sclera: Conjunctivae normal.      Pupils: Pupils are equal, round, and reactive to light.   Cardiovascular:      Rate and Rhythm: Normal rate and regular rhythm.      Pulses: Normal pulses.      Heart sounds: Normal heart sounds.   Pulmonary:      Effort: Pulmonary effort is normal.      Breath sounds: Normal breath sounds.   Abdominal:      General: Abdomen is flat. Bowel sounds are normal.      Palpations: Abdomen is soft.   Musculoskeletal:         General: Tenderness (mild tenderness left lower back.) present. No swelling. Normal range " of motion.      Cervical back: Normal range of motion and neck supple.      Comments: SLR positive left. Decreased ROM with flexion and extension of lumbar spine along with rotation.   Skin:     General: Skin is warm and dry.      Capillary Refill: Capillary refill takes less than 2 seconds.   Neurological:      General: No focal deficit present.      Mental Status: She is alert and oriented to person, place, and time. Mental status is at baseline.   Psychiatric:         Mood and Affect: Mood normal.         Behavior: Behavior normal.         Thought Content: Thought content normal.         Judgment: Judgment normal.       Assessment/Plan   Problem List Items Addressed This Visit             ICD-10-CM    Displacement of lumbar intervertebral disc with radiculopathy M51.16     Other Visit Diagnoses         Codes    Acute left-sided low back pain without sciatica    -  Primary M54.50    Relevant Medications    ibuprofen 800 mg tablet    predniSONE (Deltasone) 10 mg tablet    tiZANidine (Zanaflex) 4 mg tablet    lidocaine (Lidoderm) 5 % patch    Other Relevant Orders    Referral to Physical Therapy    XR lumbar spine 2-3 views          1) Low back pain: x-ray ordered of lumbar spine. You were prescribed tapering dose of Prednisone, Tizanidine and Lidocaine patches. Ibuprofen refilled; hold on Ibuprofen until tapering dose of Prednisone is done. May take Tylenol in during the day after steroid dose. Referral placed for physical therapy. Recommend lumbar spine stretches along with heat/ice alternation to lower spine. Follow-up for recheck in 2 weeks.

## 2024-06-05 ENCOUNTER — TELEPHONE (OUTPATIENT)
Dept: PRIMARY CARE | Facility: CLINIC | Age: 29
End: 2024-06-05
Payer: COMMERCIAL

## 2024-06-05 NOTE — TELEPHONE ENCOUNTER
Patient wants to know did she to come back and make a follow up because she can't see physical therapy til July 03, 2024. She said she also tried to call around and that was the soonest. PT wants to know what she's suppose to do during the mean time.

## 2024-06-05 NOTE — TELEPHONE ENCOUNTER
Let Ezra know I am completing her [place] paperwork. I can't give her more than 2 weeks off. I realize she won't see PT until July 3, but paperwork will reflect 2 weeks off; especially since she doesn't recall any injury.

## 2024-06-06 NOTE — TELEPHONE ENCOUNTER
can't see physical therapy til July 03, 2024. She said she also tried to call around and that was the soonest. PT wants to know what she's suppose to do during the mean time.

## 2024-06-10 ENCOUNTER — HOSPITAL ENCOUNTER (OUTPATIENT)
Dept: RADIOLOGY | Facility: CLINIC | Age: 29
Discharge: HOME | End: 2024-06-10
Payer: COMMERCIAL

## 2024-06-10 DIAGNOSIS — M54.50 ACUTE LEFT-SIDED LOW BACK PAIN WITHOUT SCIATICA: ICD-10-CM

## 2024-06-10 PROCEDURE — 72100 X-RAY EXAM L-S SPINE 2/3 VWS: CPT | Performed by: RADIOLOGY

## 2024-06-10 PROCEDURE — 72100 X-RAY EXAM L-S SPINE 2/3 VWS: CPT

## 2024-07-03 ENCOUNTER — EVALUATION (OUTPATIENT)
Dept: PHYSICAL THERAPY | Facility: CLINIC | Age: 29
End: 2024-07-03
Payer: COMMERCIAL

## 2024-07-03 DIAGNOSIS — M54.50 ACUTE LEFT-SIDED LOW BACK PAIN WITHOUT SCIATICA: ICD-10-CM

## 2024-07-03 PROCEDURE — 97161 PT EVAL LOW COMPLEX 20 MIN: CPT | Mod: GP | Performed by: PHYSICAL THERAPIST

## 2024-07-03 PROCEDURE — 97110 THERAPEUTIC EXERCISES: CPT | Mod: GP | Performed by: PHYSICAL THERAPIST

## 2024-07-03 ASSESSMENT — ENCOUNTER SYMPTOMS
OCCASIONAL FEELINGS OF UNSTEADINESS: 0
LOSS OF SENSATION IN FEET: 0
DEPRESSION: 0
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
## 2024-07-22 ENCOUNTER — OFFICE VISIT (OUTPATIENT)
Dept: PRIMARY CARE | Facility: CLINIC | Age: 29
End: 2024-07-22
Payer: COMMERCIAL

## 2024-07-22 VITALS — HEIGHT: 69 IN | BODY MASS INDEX: 43.4 KG/M2 | WEIGHT: 293 LBS

## 2024-07-22 DIAGNOSIS — M54.50 ACUTE LEFT-SIDED LOW BACK PAIN WITHOUT SCIATICA: Primary | ICD-10-CM

## 2024-07-22 PROCEDURE — 99213 OFFICE O/P EST LOW 20 MIN: CPT | Performed by: NURSE PRACTITIONER

## 2024-07-22 PROCEDURE — 3008F BODY MASS INDEX DOCD: CPT | Performed by: NURSE PRACTITIONER

## 2024-07-22 RX ORDER — LIDOCAINE 50 MG/G
1 PATCH TOPICAL DAILY
Qty: 15 PATCH | Refills: 1 | Status: SHIPPED | OUTPATIENT
Start: 2024-07-22

## 2024-07-22 RX ORDER — TIZANIDINE 4 MG/1
4 TABLET ORAL NIGHTLY PRN
Qty: 20 TABLET | Refills: 0 | Status: SHIPPED | OUTPATIENT
Start: 2024-07-22

## 2024-07-22 NOTE — PROGRESS NOTES
"Subjective   Patient ID: Ezra Cox is a 29 y.o. female who presents for Back Pain (Lower back pain /Side pain as well/Last couple days, got worse yesterday ).    Ezra presents to the office today with concerns of lower back pain; more so on left lower back. She reported some radiculopathy LLE. Stated she made a quick move over a month ago (at work), and back started aching. Not sleeping well during the night due to low back pain. Stated Ibuprofen, Tizanidine and Lidocaine patches will work for a short period of time, then back pain returns. Stated she is unable to lift her 30 lb 2 year old as her back is too painful.          Review of Systems   All other systems reviewed and are negative.      Objective   Ht 1.74 m (5' 8.5\")   Wt 149 kg (329 lb)   BMI 49.30 kg/m²     Physical Exam  Constitutional:       Appearance: Normal appearance.   HENT:      Head: Normocephalic and atraumatic.      Right Ear: Tympanic membrane normal.      Left Ear: Tympanic membrane normal.      Nose: Nose normal.      Mouth/Throat:      Mouth: Mucous membranes are moist.      Pharynx: Oropharynx is clear.   Eyes:      Extraocular Movements: Extraocular movements intact.      Conjunctiva/sclera: Conjunctivae normal.      Pupils: Pupils are equal, round, and reactive to light.   Cardiovascular:      Rate and Rhythm: Normal rate and regular rhythm.      Pulses: Normal pulses.      Heart sounds: Normal heart sounds.   Pulmonary:      Effort: Pulmonary effort is normal.      Breath sounds: Normal breath sounds.   Abdominal:      General: Abdomen is flat. Bowel sounds are normal.      Palpations: Abdomen is soft.   Musculoskeletal:      Cervical back: Normal range of motion and neck supple.      Comments: Decreased ROM lumbar spine with flexion and extension. Left straight leg raise positive.   Skin:     General: Skin is warm and dry.      Capillary Refill: Capillary refill takes less than 2 seconds.   Neurological:      General: No " focal deficit present.      Mental Status: She is alert and oriented to person, place, and time. Mental status is at baseline.   Psychiatric:         Mood and Affect: Mood normal.         Behavior: Behavior normal.         Thought Content: Thought content normal.         Judgment: Judgment normal.         Assessment/Plan   Problem List Items Addressed This Visit    None  Visit Diagnoses         Codes    Acute left-sided low back pain without sciatica    -  Primary M54.50    Relevant Medications    lidocaine (Lidoderm) 5 % patch    tiZANidine (Zanaflex) 4 mg tablet    Other Relevant Orders    Referral to Orthopaedic Surgery          1) Low back pain: continue PT. Referral placed for PT. Tizanidine and Lidocaine patches refilled.

## 2024-07-23 ENCOUNTER — TREATMENT (OUTPATIENT)
Dept: PHYSICAL THERAPY | Facility: CLINIC | Age: 29
End: 2024-07-23
Payer: COMMERCIAL

## 2024-07-23 DIAGNOSIS — M54.50 ACUTE LEFT-SIDED LOW BACK PAIN WITHOUT SCIATICA: ICD-10-CM

## 2024-07-23 PROCEDURE — 97110 THERAPEUTIC EXERCISES: CPT | Mod: GP,CQ

## 2024-07-23 ASSESSMENT — PAIN SCALES - GENERAL: PAINLEVEL_OUTOF10: 8

## 2024-07-23 ASSESSMENT — PAIN - FUNCTIONAL ASSESSMENT: PAIN_FUNCTIONAL_ASSESSMENT: 0-10

## 2024-07-23 NOTE — PROGRESS NOTES
Physical Therapy    Physical Therapy Evaluation and Treatment      Patient Name: Ezra Cox  MRN: [id number]  Today's Date: 7/23/2024  Visit: 2  Insurance: Reviewed  Physician: Jamila Painting     PT Therapeutic Procedures Time Entry  Therapeutic Exercise Time Entry: 45  Time Calculation  Start Time: 0845  Stop Time: 0930  Time Calculation (min): 45 min    Assessment:   PT Assessment  Evaluation/Treatment Tolerance: Patient tolerated treatment well   Difficulty with bed mobility, left sided low back pain/aching. SKTC able to be completed with discomfort. Good response to added core strengthening. Sit to stands able to be completed with tolerable discomfort through left side of low back. Finishes session feeling the same as when starting, 8/10 left side.     Plan:  Continue with POC  General fitness, LE stretches, gentle back stretches, core strength, posture/body mechanics, HP     Current Problem:   1. Acute left-sided low back pain without sciatica  Follow Up In Physical Therapy        Subjective     Lower back pain today is an 8/10, mostly through left side  Since my evaluation I have done my exercises mornings and nights, I feel that the exercises have minimally helped  Never have had tingling down the leg  Saw PC yesterday who referred who to an ortho doctor    Precautions: none        Prior Level of Function: no limits      Objective     Outcome Measures:      Treatments:  LTR x 20  KTC 2 x 20 sec  PPT x 20  Hip adduction with TA holds 20 x 5 sec  Hip abduction Blue TB x 20  TA holds 20 x 5 sec  Prone on elbows x 3 min  Prone min props x 15  Sit to stand from elevated mat table x 15    MHP after next session    EDUCATION: HEP     Goals:  Active       PT Problem       PT Goal 1       Start:  07/03/24    Expected End:  10/01/24       Back pain 3/10 or less         PT Goal 2       Start:  07/03/24    Expected End:  10/01/24       Improve oswestry by 20%         PT Goal 3       Start:  07/03/24    Expected End:   10/01/24       Maximize lumbar ROM and LE flexibility         PT Goal 4       Start:  07/03/24    Expected End:  10/01/24       Fair abdominal strength  Good back extensor strength   5/5 hip strength

## 2024-07-24 ENCOUNTER — APPOINTMENT (OUTPATIENT)
Dept: PHYSICAL THERAPY | Facility: CLINIC | Age: 29
End: 2024-07-24
Payer: COMMERCIAL

## 2024-07-29 ENCOUNTER — TREATMENT (OUTPATIENT)
Dept: PHYSICAL THERAPY | Facility: CLINIC | Age: 29
End: 2024-07-29
Payer: COMMERCIAL

## 2024-07-29 ENCOUNTER — TELEPHONE (OUTPATIENT)
Dept: PRIMARY CARE | Facility: CLINIC | Age: 29
End: 2024-07-29

## 2024-07-29 DIAGNOSIS — M54.50 ACUTE LEFT-SIDED LOW BACK PAIN WITHOUT SCIATICA: ICD-10-CM

## 2024-07-29 PROCEDURE — 97110 THERAPEUTIC EXERCISES: CPT | Mod: GP,CQ

## 2024-07-29 ASSESSMENT — PAIN SCALES - GENERAL: PAINLEVEL_OUTOF10: 4

## 2024-07-29 ASSESSMENT — PAIN - FUNCTIONAL ASSESSMENT: PAIN_FUNCTIONAL_ASSESSMENT: 0-10

## 2024-07-29 NOTE — TELEPHONE ENCOUNTER
Patient came today and said that , her job still haven't received her papers , and that they need faxed over so that they don't close the case

## 2024-07-29 NOTE — TELEPHONE ENCOUNTER
Patient is concern her case will be closed be closed for accomodation, they want medical office visit and medical notes from the visits. She said pt isn't able to fill it out , but the job said she had until 07/26/2024. Pt wants to know is there away we can send medical notes and office visits ? Please advise      If the physician statement  is filled out , she would like that to get faxed over , she said they just needs the dates

## 2024-07-29 NOTE — PROGRESS NOTES
Physical Therapy    Physical Therapy Evaluation and Treatment      Patient Name: Ezra Cox  MRN: [id number]  Today's Date: 7/29/2024  Visit: 3  Insurance: Reviewed  Physician: Jamila Painting     PT Therapeutic Procedures Time Entry  Therapeutic Exercise Time Entry: 45  Time Calculation  Start Time: 0845  Stop Time: 0930  Time Calculation (min): 45 min    Assessment:   PT Assessment  Evaluation/Treatment Tolerance: Patient tolerated treatment well   Improved overall tolerance for today's treatment session compared to last. Hamstring stretching added, reports moderate tightness. Cueing for dead bug exercise. Enjoyed theraball flexions. Finishes session feeling good, pain 3-4/10.     Plan:  Continue with POC  General fitness, LE stretches, gentle back stretches, core strength, posture/body mechanics, HP     Current Problem:   1. Acute left-sided low back pain without sciatica  Follow Up In Physical Therapy        Subjective     Patient has been feeling better since her last visit  Pain currently is a 4/10  Performed HEP in mornings and at night  Using lidocaine patches as needed    Precautions: none        Prior Level of Function: no limits      Objective     Outcome Measures:      Treatments:  Stepper x 7 min lvl 2.5  Supine hamstring stretch with strap 3 x 20 sec each  LTR x 20  Bridge x 20  Hip adduction with TA holds 20 x 5 sec  Hip abduction Blue TB x 20  Hook lying march Blue TB x 20 TA hold  Dead bug x 10 each side  Theraball forward flexions x 15  Sit to stand from elevated mat table x 15    EDUCATION: HEP     Goals:  Active       PT Problem       PT Goal 1       Start:  07/03/24    Expected End:  10/01/24       Back pain 3/10 or less         PT Goal 2       Start:  07/03/24    Expected End:  10/01/24       Improve oswestry by 20%         PT Goal 3       Start:  07/03/24    Expected End:  10/01/24       Maximize lumbar ROM and LE flexibility         PT Goal 4       Start:  07/03/24    Expected End:   10/01/24       Fair abdominal strength  Good back extensor strength   5/5 hip strength

## 2024-08-06 ENCOUNTER — APPOINTMENT (OUTPATIENT)
Dept: PHYSICAL THERAPY | Facility: CLINIC | Age: 29
End: 2024-08-06
Payer: COMMERCIAL

## 2024-08-06 DIAGNOSIS — M54.50 CHRONIC BILATERAL LOW BACK PAIN WITHOUT SCIATICA: ICD-10-CM

## 2024-08-06 DIAGNOSIS — M51.16 DISPLACEMENT OF LUMBAR INTERVERTEBRAL DISC WITH RADICULOPATHY: Primary | ICD-10-CM

## 2024-08-06 DIAGNOSIS — G89.29 CHRONIC BILATERAL LOW BACK PAIN WITHOUT SCIATICA: ICD-10-CM

## 2024-08-06 DIAGNOSIS — M54.50 ACUTE LEFT-SIDED LOW BACK PAIN WITHOUT SCIATICA: ICD-10-CM

## 2024-08-07 ENCOUNTER — TELEPHONE (OUTPATIENT)
Dept: PRIMARY CARE | Facility: CLINIC | Age: 29
End: 2024-08-07
Payer: COMMERCIAL

## 2024-08-07 NOTE — TELEPHONE ENCOUNTER
Christopher from physical therapy called about pt he said if you could please give me a call back   Cell phone:  [phone]

## 2024-08-13 ENCOUNTER — TREATMENT (OUTPATIENT)
Dept: PHYSICAL THERAPY | Facility: CLINIC | Age: 29
End: 2024-08-13
Payer: COMMERCIAL

## 2024-08-13 DIAGNOSIS — M54.50 ACUTE LEFT-SIDED LOW BACK PAIN WITHOUT SCIATICA: ICD-10-CM

## 2024-08-13 PROCEDURE — 97110 THERAPEUTIC EXERCISES: CPT | Mod: GP | Performed by: PHYSICAL THERAPIST

## 2024-08-13 NOTE — PROGRESS NOTES
Physical Therapy    Physical Therapy Evaluation and Treatment      Patient Name: Ezra Cox  MRN: [id number]  Today's Date: 8/13/2024  Visit: 4  Insurance: Reviewed  Physician: Jamila Painting             Assessment:  Patient arrives 20 minutes late to scheduled PT visit.  Patient started on lifting technique and lifting activities to assist her to return to her regular duty job at [employer].        Plan:  Continue with POC  General fitness, LE stretches, gentle back stretches, core strength, posture/body mechanics, HP     Current Problem:   1. 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  Had 1 FCE over a year ago    Precautions: none        Prior Level of Function: no limits      Objective     Outcome Measures:      Treatments:  Stepper x 7 min lvl 2.5  Step stretch 20X  Resisted shoulder retraction and extension with green theraband 20X   Supine hamstring stretch with strap 3 x 20 sec each  LTR x 20  Theraball KTC with strap 20X  Prone press up 20X  Lifting from plynth 10' with 20# - waist level X 10  Lifting from plynth to floor with 20# X 5 - once to the right - once to the left  (25 minutes)    Not today  Bridge x 20  Hip adduction with TA holds 20 x 5 sec  Hip abduction Blue TB x 20  Hook lying march Blue TB x 20 TA hold  Dead bug x 10 each side  Theraball forward flexions x 15  Sit to stand from elevated mat table x 15    EDUCATION: HEP     Goals:  Active       PT Problem       PT Goal 1       Start:  07/03/24    Expected End:  10/01/24       Back pain 3/10 or less         PT Goal 2       Start:  07/03/24    Expected End:  10/01/24       Improve oswestry by 20%          PT Goal 3       Start:  07/03/24    Expected End:  10/01/24       Maximize lumbar ROM and LE flexibility         PT Goal 4       Start:  07/03/24    Expected End:  10/01/24       Fair abdominal strength  Good back extensor strength   5/5 hip strength

## 2024-08-20 ENCOUNTER — APPOINTMENT (OUTPATIENT)
Dept: PHYSICAL THERAPY | Facility: CLINIC | Age: 29
End: 2024-08-20
Payer: COMMERCIAL

## 2024-08-27 ENCOUNTER — APPOINTMENT (OUTPATIENT)
Dept: PHYSICAL THERAPY | Facility: CLINIC | Age: 29
End: 2024-08-27
Payer: COMMERCIAL

## 2024-08-27 DIAGNOSIS — M54.50 ACUTE LEFT-SIDED LOW BACK PAIN WITHOUT SCIATICA: ICD-10-CM

## 2024-08-27 PROCEDURE — 97110 THERAPEUTIC EXERCISES: CPT | Mod: GP | Performed by: PHYSICAL THERAPIST

## 2024-08-27 NOTE — PROGRESS NOTES
Physical Therapy    Physical Therapy Evaluation and Treatment      Patient Name: Ezra Cox  MRN: [id number]  Today's Date: 8/27/2024  Visit: 5  Insurance: Reviewed  Physician: Jamila Painting     PT Therapeutic Procedures Time Entry  Therapeutic Exercise Time Entry: 40  Time Calculation  Start Time: 0815  Stop Time: 0855  Time Calculation (min): 40 min    Assessment:  Patient with slowly decreasing back pain.  Still on light duty at work.  Patient complains of leg fatigue and soreness with lifting.  Encouraged to practice squatting at home to work on leg strength.          Plan:  Continue with POC  General fitness, LE stretches, gentle back stretches, core strength, posture/body mechanics, HP     Current Problem:   1. 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   Pain currently is a 4/10  Pain in pm and am  Working at service desk - with frequent breaks  Seeing ortho next month for back pain    Precautions: none        Prior Level of Function: no limits      Objective     Outcome Measures:      Treatments:  Stepper x 10 min lvl 2.5  Step stretch 20X  Resisted shoulder retraction and extension with green theraband 20X   Resisted shoulder horizontal abd with green theraband 20X  Heel raises 20X  Squats 20X   LTR x 20  Prone press up 20X  Lifting from plynth 10' with 20# - waist level X 10  Lifting from plynth to floor with 20# X 5 - once to the right - once to the left  (40 minutes)    Not today  Bridge x 20  Hip adduction with TA holds 20 x 5 sec  Hip abduction Blue TB x 20  Hook lying march Blue TB x 20 TA hold  Dead bug x 10 each side  Theraball forward flexions x 15  Sit to stand from elevated mat table x 15    EDUCATION: HEP     Goals:  Active       PT Problem       PT Goal 1       Start:  07/03/24    Expected End:  10/01/24       Back pain 3/10 or less         PT Goal 2       Start:   07/03/24    Expected End:  10/01/24       Improve oswestry by 20%         PT Goal 3       Start:  07/03/24    Expected End:  10/01/24       Maximize lumbar ROM and LE flexibility         PT Goal 4       Start:  07/03/24    Expected End:  10/01/24       Fair abdominal strength  Good back extensor strength   5/5 hip strength

## 2024-09-10 ENCOUNTER — APPOINTMENT (OUTPATIENT)
Dept: PHYSICAL THERAPY | Facility: CLINIC | Age: 29
End: 2024-09-10
Payer: COMMERCIAL

## 2024-09-24 ENCOUNTER — APPOINTMENT (OUTPATIENT)
Dept: PHYSICAL THERAPY | Facility: CLINIC | Age: 29
End: 2024-09-24
Payer: COMMERCIAL

## 2024-09-26 ENCOUNTER — OFFICE VISIT (OUTPATIENT)
Dept: OBSTETRICS AND GYNECOLOGY | Facility: CLINIC | Age: 29
End: 2024-09-26
Payer: COMMERCIAL

## 2024-09-26 VITALS
SYSTOLIC BLOOD PRESSURE: 112 MMHG | DIASTOLIC BLOOD PRESSURE: 72 MMHG | BODY MASS INDEX: 43.4 KG/M2 | WEIGHT: 293 LBS | HEIGHT: 69 IN

## 2024-09-26 DIAGNOSIS — R35.0 URINARY FREQUENCY: Primary | ICD-10-CM

## 2024-09-26 PROCEDURE — 87086 URINE CULTURE/COLONY COUNT: CPT

## 2024-09-26 PROCEDURE — 99214 OFFICE O/P EST MOD 30 MIN: CPT | Performed by: OBSTETRICS & GYNECOLOGY

## 2024-09-26 PROCEDURE — 3008F BODY MASS INDEX DOCD: CPT | Performed by: OBSTETRICS & GYNECOLOGY

## 2024-09-26 PROCEDURE — 1036F TOBACCO NON-USER: CPT | Performed by: OBSTETRICS & GYNECOLOGY

## 2024-09-26 NOTE — PROGRESS NOTES
Ezra Cox is a 29 y.o. female who presents with a chief complaint of UTI (Patient complains she is having frequent urination, lower abdominal and lower back pains )      SUBJECTIVE  Patient presents with urinary frequency and low back pain stated that been going on for couple weeks.  She states she does drink a lot of water but it still feels abnormal to her.  She otherwise has not had a Pap in about 2 years.    Past Medical History:   Diagnosis Date    Encounter for gynecological examination (general) (routine) without abnormal findings 2020    Well woman exam    Encounter for supervision of normal pregnancy, unspecified, second trimester ([facility]) 2021    Second trimester pregnancy    Personal history of urinary (tract) infections 10/03/2016    History of urinary tract infection    Unspecified abdominal pain 2018    Abdominal cramps     Past Surgical History:   Procedure Laterality Date     SECTION, CLASSIC  10/10/2016     Section     Social History     Socioeconomic History    Marital status: Single   Tobacco Use    Smoking status: Never     Passive exposure: Past    Smokeless tobacco: Never   Vaping Use    Vaping status: Never Used   Substance and Sexual Activity    Alcohol use: Not Currently    Drug use: Never    Sexual activity: Not Currently     Family History   Problem Relation Name Age of Onset    Colon cancer Other grandparent     Pancreatic cancer Other grandmother        OB History    Para Term  AB Living   4 3     1 3   SAB IAB Ectopic Multiple Live Births     1            # Outcome Date GA Lbr Rajan/2nd Weight Sex Type Anes PTL Lv   4 IAB            3 Para            2 Para            1 Para                OBJECTIVE  No Known Allergies   (Not in a hospital admission)       Review of Systems  History obtained from the patient  General ROS: negative  Psychological ROS: negative  Gastrointestinal ROS: no abdominal pain, change in bowel habits, or black  "or bloody stools  Musculoskeletal ROS: negative  Physical Exam  General Appearance: awake, alert, oriented, in no acute distress, well developed, well nourished, and in no acute distress  Skin: there are no suspicious lesions or rashes of concern, skin color, texture, turgor are normal; there are no bruises, rashes or lesions.  Head/Face: NCAT  Eyes: No gross abnormalities., PERRL, and EOMI  Neck: neck- supple, no mass, non-tender  Back: no pain to palpation  Abdomen: Soft, non-tender, normal bowel sounds; no bruits, organomegaly or masses.  Extremities: Extremities warm to touch, pink, with no edema.  Musculoskeletal: negative    /72   Ht 1.74 m (5' 8.5\")   Wt (!) 155 kg (341 lb)   LMP  (LMP Unknown) Comment: 2 weeks ago  BMI 51.09 kg/m²    Problem List Items Addressed This Visit    None  Visit Diagnoses       Urinary frequency    -  Primary    Relevant Orders    Urine culture    C. trachomatis / N. gonorrhoeae, Amplified    Trichomonas vaginalis, Amplified         Annual 1 mth      "

## 2024-09-27 LAB — BACTERIA UR CULT: NORMAL

## 2024-10-22 ENCOUNTER — APPOINTMENT (OUTPATIENT)
Dept: OBSTETRICS AND GYNECOLOGY | Facility: CLINIC | Age: 29
End: 2024-10-22
Payer: COMMERCIAL

## 2024-10-24 ENCOUNTER — OFFICE VISIT (OUTPATIENT)
Dept: PAIN MEDICINE | Facility: CLINIC | Age: 29
End: 2024-10-24
Payer: COMMERCIAL

## 2024-10-24 VITALS
HEIGHT: 68 IN | DIASTOLIC BLOOD PRESSURE: 65 MMHG | SYSTOLIC BLOOD PRESSURE: 138 MMHG | HEART RATE: 73 BPM | TEMPERATURE: 96.6 F | RESPIRATION RATE: 20 BRPM | WEIGHT: 293 LBS | BODY MASS INDEX: 44.41 KG/M2 | OXYGEN SATURATION: 98 %

## 2024-10-24 DIAGNOSIS — M54.41 CHRONIC RIGHT-SIDED LOW BACK PAIN WITH RIGHT-SIDED SCIATICA: ICD-10-CM

## 2024-10-24 DIAGNOSIS — G89.29 CHRONIC RIGHT-SIDED LOW BACK PAIN WITH RIGHT-SIDED SCIATICA: ICD-10-CM

## 2024-10-24 DIAGNOSIS — M51.26 LUMBAR DISC HERNIATION: ICD-10-CM

## 2024-10-24 DIAGNOSIS — M54.16 LUMBAR NEURITIS: Primary | ICD-10-CM

## 2024-10-24 PROCEDURE — 99215 OFFICE O/P EST HI 40 MIN: CPT | Performed by: ANESTHESIOLOGY

## 2024-10-24 ASSESSMENT — COLUMBIA-SUICIDE SEVERITY RATING SCALE - C-SSRS
2. HAVE YOU ACTUALLY HAD ANY THOUGHTS OF KILLING YOURSELF?: NO
6. HAVE YOU EVER DONE ANYTHING, STARTED TO DO ANYTHING, OR PREPARED TO DO ANYTHING TO END YOUR LIFE?: NO
1. IN THE PAST MONTH, HAVE YOU WISHED YOU WERE DEAD OR WISHED YOU COULD GO TO SLEEP AND NOT WAKE UP?: NO

## 2024-10-24 ASSESSMENT — PAIN SCALES - GENERAL
PAINLEVEL_OUTOF10: 8
PAINLEVEL_OUTOF10: 8

## 2024-10-24 ASSESSMENT — ENCOUNTER SYMPTOMS
LOSS OF SENSATION IN FEET: 0
OCCASIONAL FEELINGS OF UNSTEADINESS: 0
DEPRESSION: 0

## 2024-10-24 ASSESSMENT — PATIENT HEALTH QUESTIONNAIRE - PHQ9
SUM OF ALL RESPONSES TO PHQ9 QUESTIONS 1 AND 2: 0
1. LITTLE INTEREST OR PLEASURE IN DOING THINGS: NOT AT ALL
2. FEELING DOWN, DEPRESSED OR HOPELESS: NOT AT ALL

## 2024-10-24 ASSESSMENT — PAIN - FUNCTIONAL ASSESSMENT: PAIN_FUNCTIONAL_ASSESSMENT: 0-10

## 2024-10-24 ASSESSMENT — LIFESTYLE VARIABLES: TOTAL SCORE: 1

## 2024-10-24 ASSESSMENT — PAIN DESCRIPTION - DESCRIPTORS: DESCRIPTORS: ACHING

## 2024-10-24 NOTE — PROGRESS NOTES
History Of Present Illness  Ezra Cox is a 29 y.o. female presenting with   Chief Complaint   Patient presents with   • Back Pain     NPV 8/10 right low back pain non-radiating off/on for about a year.       Patient presents with complaints of chronic low back pain to the RLE with N/T and tom horse like sensation.  The pain is intermittent worse with bending activity and better with rest/sitting. The pain is sharp, stabbing and shooting to the RLE. Denies LE paresthesias, weakness, saddle anesthesia, bowel or bladder incontinence. To manage this pain the patient has attempted Lidocaine patches, muscle relaxers, PT, Ibuprofen. The patient has undergone a lumbar injection with Dr. Su with  with greater than 80% relief for several months.     PAIN SCORE: 7/10.    PP: Dr. Su  PCP: Jamila Painting CNP       Past Medical History  She has a past medical history of Encounter for gynecological examination (general) (routine) without abnormal findings (2020), Encounter for supervision of normal pregnancy, unspecified, second trimester ([facility]) (2021), Personal history of urinary (tract) infections (10/03/2016), and Unspecified abdominal pain (2018).    Surgical History  She has a past surgical history that includes  section, classic (10/10/2016).     Social History  She reports that she has never smoked. She has been exposed to tobacco smoke. She has never used smokeless tobacco. She reports that she does not currently use alcohol. She reports that she does not use drugs.    Family History  Family History   Problem Relation Name Age of Onset   • Colon cancer Other grandparent    • Pancreatic cancer Other grandmother         Allergies  Patient has no known allergies.    Review of Systems    All other systems reviewed and negative for any deficits. Pertinent positives and negatives were considered in the medical decision making process.        Physical Exam  /65 (BP Location:  Right arm, Patient Position: Sitting, BP Cuff Size: Adult)   Pulse 73   Temp 35.9 °C (96.6 °F) (Tympanic)   Resp 20   Wt (!) 155 kg (341 lb)   SpO2 98%     General: Pt appears stated age    Eyes: Conjunctiva non-icteric and lids without obvious rash or drooping. Pupils are symmetric.    ENT: External ears and nose appear to be without deformity or rash. No lesions or masses noted. Hearing is grossly intact.    Respiratory: No gasping or shortness of breath noted. No use of accessory muscles noted.    CVS: Extremities show no edema or varicosities    Skin: No rashes or open lesions/ulcers identified on skin. No induration/tightening noted with palpation of skin.     Musculoskeletal: Joes is antalgic     Stability: No subluxation noted on movement of bilateral upper extremities or head/neck.     Strength: 3-4/5 in RLE and 5/5 in LLE     Range of Motion: WNL    Neurologic: Reflexes 2+    Sensation: WNL    Neurologic: Cranial Nerves II thru XII are grossly intact    Psychiatric: Pt is alert and oriented to time, person, and place           Assessment/Plan   1. Lumbar neuritis        2. Lumbar disc herniation        3. Chronic right-sided low back pain with right-sided sciatica          I have provided the patient with a list of physical therapy exercises to learn and perform to strengthen core, maintain stabilization, and reduce pain. We reviewed the exercises in detail and I encouraged them to perform them on a regular basis.  I would recommend the pt start on Gabapentin to help with nerve related pain. We discussed the risks, benefits, and side effects to this medication including the mechanism of action and the pt understands and agrees.  I extensively reviewed the patients MRI findings in detail, including review of the actual images and provided a detailed explanation of the findings using a spine model. There are diffuse disc bulges at L3/4, L4/5 and a disc herniation at the L5/S1 level. There is moderate  canal stenosis at the L3/4 and L4/5 levels.   The patient is a candidate for an LESI L5/S1 to treat low back and radicular pain. I spent time with the patient discussing the risks, benefits, and alternatives to this measure including, but not limited to worsening pain with injection, no improvement/guarantee with the procedure, numbness in the lower extremity and risk of falls post procedure, vagal reaction/ hypotension, feeling dizzy / lightheaded, spinal infection, worsening pre-existing infections, epidural hematoma, epidural abscess, nerve injury, paralysis, spinal fluid leak and spinal headache. The patient understands all of these risks and agrees to proceed with the planned procedure.   We also extensively discussed weight loss given her BMI is over 40 and its effects on her spine.          Andrew Latif MD    I spent time with the patient reviewing their imaging and discussing the risks benefits and alternatives to the above plan. A total of 60 minutes was spent reviewing the data and greater than 50% of that time was with the patient during the face to face encounter discussing treatment options both surgical, non-surgical, and minimally invasive techniques.     I also reviewed records from her previous Pain physician and injections.

## 2024-10-28 ENCOUNTER — APPOINTMENT (OUTPATIENT)
Dept: PRIMARY CARE | Facility: CLINIC | Age: 29
End: 2024-10-28
Payer: COMMERCIAL

## 2024-10-28 VITALS — SYSTOLIC BLOOD PRESSURE: 138 MMHG | WEIGHT: 293 LBS | DIASTOLIC BLOOD PRESSURE: 84 MMHG | BODY MASS INDEX: 52.46 KG/M2

## 2024-10-28 DIAGNOSIS — E66.01 CLASS 3 SEVERE OBESITY DUE TO EXCESS CALORIES WITHOUT SERIOUS COMORBIDITY WITH BODY MASS INDEX (BMI) OF 40.0 TO 44.9 IN ADULT: ICD-10-CM

## 2024-10-28 DIAGNOSIS — E66.813 CLASS 3 SEVERE OBESITY DUE TO EXCESS CALORIES WITHOUT SERIOUS COMORBIDITY WITH BODY MASS INDEX (BMI) OF 40.0 TO 44.9 IN ADULT: ICD-10-CM

## 2024-10-28 DIAGNOSIS — M54.50 LUMBAR SPINE PAIN: Primary | ICD-10-CM

## 2024-10-28 RX ORDER — PREDNISONE 20 MG/1
20 TABLET ORAL EVERY MORNING
Qty: 5 TABLET | Refills: 0 | Status: SHIPPED | OUTPATIENT
Start: 2024-10-28 | End: 2024-11-02

## 2024-10-28 ASSESSMENT — ENCOUNTER SYMPTOMS
TROUBLE SWALLOWING: 0
BACK PAIN: 1
COUGH: 0
NUMBNESS: 0
PALPITATIONS: 0
APPETITE CHANGE: 0
WHEEZING: 0
JOINT SWELLING: 0
TREMORS: 0
FREQUENCY: 0
FEVER: 0
FLANK PAIN: 0
HEADACHES: 0
CHILLS: 0
EYE DISCHARGE: 0
NAUSEA: 0
SLEEP DISTURBANCE: 0
EYE PAIN: 0
DIZZINESS: 0
VOMITING: 0
ABDOMINAL PAIN: 0
WOUND: 0
CONSTIPATION: 1
WEAKNESS: 0
NERVOUS/ANXIOUS: 0
UNEXPECTED WEIGHT CHANGE: 0
CONFUSION: 0
SHORTNESS OF BREATH: 0
SEIZURES: 0
SORE THROAT: 0
DYSURIA: 0
HEMATURIA: 0
DIARRHEA: 0
BLOOD IN STOOL: 0
ROS GI COMMENTS: GASSY

## 2024-11-14 ENCOUNTER — APPOINTMENT (OUTPATIENT)
Dept: OBSTETRICS AND GYNECOLOGY | Facility: CLINIC | Age: 29
End: 2024-11-14
Payer: COMMERCIAL

## 2024-11-22 ENCOUNTER — APPOINTMENT (OUTPATIENT)
Dept: OBSTETRICS AND GYNECOLOGY | Facility: CLINIC | Age: 29
End: 2024-11-22
Payer: COMMERCIAL

## 2024-11-22 VITALS
BODY MASS INDEX: 53.07 KG/M2 | HEART RATE: 72 BPM | WEIGHT: 293 LBS | SYSTOLIC BLOOD PRESSURE: 112 MMHG | DIASTOLIC BLOOD PRESSURE: 77 MMHG

## 2024-11-22 DIAGNOSIS — L73.2 HIDRADENITIS SUPPURATIVA: Primary | ICD-10-CM

## 2024-11-22 DIAGNOSIS — N93.9 ABNORMAL UTERINE BLEEDING (AUB): ICD-10-CM

## 2024-11-22 DIAGNOSIS — D25.9 UTERINE LEIOMYOMA, UNSPECIFIED LOCATION: ICD-10-CM

## 2024-11-22 PROCEDURE — 99214 OFFICE O/P EST MOD 30 MIN: CPT | Performed by: STUDENT IN AN ORGANIZED HEALTH CARE EDUCATION/TRAINING PROGRAM

## 2024-11-22 PROCEDURE — 1036F TOBACCO NON-USER: CPT | Performed by: STUDENT IN AN ORGANIZED HEALTH CARE EDUCATION/TRAINING PROGRAM

## 2024-11-23 NOTE — PROGRESS NOTES
Subjective   Patient ID: Ezra Cox is a 29 y.o. female who presents for Annual Exam.  Patient initially here for annual visit however given the patient is on menses decision made to defer.  Patient does complain of heavy menstrual bleeding as well as bothersome symptoms from hidradenitis suppurativa and uterine fibroids.        Review of Systems   Genitourinary:  Positive for menstrual problem.   All other systems reviewed and are negative.      Objective   Physical Exam  Vitals reviewed.   Constitutional:       General: She is not in acute distress.     Appearance: She is not ill-appearing.   Pulmonary:      Effort: Pulmonary effort is normal.   Skin:     Coloration: Skin is not pale.   Neurological:      Mental Status: She is alert.         Assessment/Plan   Diagnoses and all orders for this visit:  Hidradenitis suppurativa  Abnormal uterine bleeding (AUB)  -     US PELVIS TRANSABDOMINAL WITH TRANSVAGINAL; Future  Uterine leiomyoma, unspecified location    Patient here for evaluation of multiple complaints.  Patient reports history of hidradenitis suppurativa abnormal uterine bleeding and uterine fibroids.  Discussed these issues in detail recommended that patient start antiandrogenic birth control pill which patient is not amenable to at this time.  Will plan for additional counseling at another visit in the interim patient is to use over-the-counter PanOxyl as a body wash and obtain a pelvic ultrasound to evaluate for uterine anatomy.  Let patient follow-up at her leisure for annual exam and further counseling.    Face-to-face counseling and chart review time as well as documentation at least 30 minutes.       Elio Magallanes MD 11/22/24 8:02 PM

## 2024-11-25 ENCOUNTER — TELEPHONE (OUTPATIENT)
Dept: PAIN MEDICINE | Facility: CLINIC | Age: 29
End: 2024-11-25
Payer: COMMERCIAL

## 2024-11-26 ENCOUNTER — APPOINTMENT (OUTPATIENT)
Dept: PAIN MEDICINE | Facility: CLINIC | Age: 29
End: 2024-11-26
Payer: COMMERCIAL

## 2024-12-11 ENCOUNTER — APPOINTMENT (OUTPATIENT)
Dept: OBSTETRICS AND GYNECOLOGY | Facility: CLINIC | Age: 29
End: 2024-12-11
Payer: COMMERCIAL

## 2024-12-13 ENCOUNTER — APPOINTMENT (OUTPATIENT)
Dept: PRIMARY CARE | Facility: CLINIC | Age: 29
End: 2024-12-13

## 2024-12-20 ENCOUNTER — APPOINTMENT (OUTPATIENT)
Dept: OBSTETRICS AND GYNECOLOGY | Facility: CLINIC | Age: 29
End: 2024-12-20
Payer: COMMERCIAL

## 2024-12-20 VITALS
WEIGHT: 293 LBS | BODY MASS INDEX: 44.41 KG/M2 | SYSTOLIC BLOOD PRESSURE: 114 MMHG | HEIGHT: 68 IN | DIASTOLIC BLOOD PRESSURE: 64 MMHG

## 2024-12-20 DIAGNOSIS — Z01.419 ENCOUNTER FOR ANNUAL ROUTINE GYNECOLOGICAL EXAMINATION: Primary | ICD-10-CM

## 2024-12-20 PROCEDURE — 87591 N.GONORRHOEAE DNA AMP PROB: CPT

## 2024-12-20 PROCEDURE — 87624 HPV HI-RISK TYP POOLED RSLT: CPT

## 2024-12-20 PROCEDURE — 88175 CYTOPATH C/V AUTO FLUID REDO: CPT

## 2024-12-20 PROCEDURE — 87661 TRICHOMONAS VAGINALIS AMPLIF: CPT

## 2024-12-20 PROCEDURE — 87491 CHLMYD TRACH DNA AMP PROBE: CPT

## 2024-12-20 RX ORDER — PSEUDOEPHEDRINE HCL 30 MG
30 TABLET ORAL EVERY 6 HOURS PRN
COMMUNITY
Start: 2024-12-14

## 2024-12-20 RX ORDER — ACETAMINOPHEN 325 MG/1
TABLET ORAL
COMMUNITY
Start: 2024-12-14

## 2024-12-20 ASSESSMENT — PAIN SCALES - GENERAL: PAINLEVEL_OUTOF10: 0-NO PAIN

## 2024-12-20 NOTE — PROGRESS NOTES
Subjective   Patient ID: Ezra Cox is a 29 y.o. female who presents for Annual Exam (Pap: 7/2021 WNL/No Mammo//Chaperon accepted:  Tomasa Wilcox CMA ).  Patient here for annual visit no acute complaints.  Does complain of heavy periods's hidradenitis suppurativa.  Declines medical management at this time.        Review of Systems   All other systems reviewed and are negative.      Objective   Physical Exam  Vitals reviewed. Exam conducted with a chaperone present.   Constitutional:       General: She is not in acute distress.     Appearance: Normal appearance. She is not ill-appearing.   Pulmonary:      Effort: Pulmonary effort is normal.   Abdominal:      General: There is no distension.      Palpations: Abdomen is soft. There is no mass.      Tenderness: There is no abdominal tenderness. There is no guarding or rebound.      Hernia: There is no hernia in the left inguinal area or right inguinal area.   Genitourinary:     General: Normal vulva.      Exam position: Lithotomy position.      Labia:         Right: No rash, tenderness, lesion or injury.         Left: No rash, tenderness, lesion or injury.       Urethra: No prolapse, urethral swelling or urethral lesion.      Vagina: No vaginal discharge, erythema, tenderness, bleeding, lesions or prolapsed vaginal walls.      Cervix: No cervical motion tenderness, discharge, friability, lesion, erythema or cervical bleeding.      Uterus: Not deviated, not enlarged, not fixed, not tender and no uterine prolapse.       Adnexa:         Right: No mass, tenderness or fullness.          Left: No mass, tenderness or fullness.     Musculoskeletal:      Right lower leg: No edema.      Left lower leg: No edema.   Lymphadenopathy:      Lower Body: No right inguinal adenopathy. No left inguinal adenopathy.   Neurological:      Mental Status: She is alert.         Assessment/Plan   Diagnoses and all orders for this visit:  Encounter for annual routine gynecological  examination  -     THINPREP PAP TEST    Patient here for annual GYN visit.  Pap smear with STI testing obtained.  Counseled patient about all need for pelvic ultrasound to assess AUB.  Also counseled patient about over-the-counter interventions such as PanOxyl and in the shoulders for hidradenitis suppurativa symptoms.  Patient to follow-up after pelvic ultrasound is obtained.       Elio Magallanse MD 12/20/24 2:43 PM

## 2024-12-27 ENCOUNTER — APPOINTMENT (OUTPATIENT)
Dept: RADIOLOGY | Facility: HOSPITAL | Age: 29
End: 2024-12-27
Payer: COMMERCIAL

## 2025-01-03 ENCOUNTER — APPOINTMENT (OUTPATIENT)
Dept: PRIMARY CARE | Facility: CLINIC | Age: 30
End: 2025-01-03
Payer: COMMERCIAL

## 2025-01-03 LAB
CYTOLOGY CMNT CVX/VAG CYTO-IMP: NORMAL
HPV HR 12 DNA GENITAL QL NAA+PROBE: NEGATIVE
HPV HR GENOTYPES PNL CVX NAA+PROBE: NEGATIVE
HPV16 DNA SPEC QL NAA+PROBE: NEGATIVE
HPV18 DNA SPEC QL NAA+PROBE: NEGATIVE
LAB AP HPV GENOTYPE QUESTION: YES
LAB AP HPV HR: NORMAL
LAB AP PAP ADDITIONAL TESTS: NORMAL
LABORATORY COMMENT REPORT: NORMAL
LMP START DATE: NORMAL
PATH REPORT.TOTAL CANCER: NORMAL

## 2025-01-21 ENCOUNTER — OFFICE VISIT (OUTPATIENT)
Dept: PRIMARY CARE | Facility: CLINIC | Age: 30
End: 2025-01-21
Payer: COMMERCIAL

## 2025-01-21 VITALS
OXYGEN SATURATION: 98 % | HEART RATE: 73 BPM | TEMPERATURE: 96.1 F | BODY MASS INDEX: 43.4 KG/M2 | SYSTOLIC BLOOD PRESSURE: 132 MMHG | HEIGHT: 69 IN | WEIGHT: 293 LBS | DIASTOLIC BLOOD PRESSURE: 76 MMHG

## 2025-01-21 DIAGNOSIS — Z00.00 ANNUAL PHYSICAL EXAM: Primary | ICD-10-CM

## 2025-01-21 DIAGNOSIS — G47.19 EXCESSIVE DAYTIME SLEEPINESS: ICD-10-CM

## 2025-01-21 DIAGNOSIS — R06.83 SNORING: ICD-10-CM

## 2025-01-21 DIAGNOSIS — M54.50 ACUTE LEFT-SIDED LOW BACK PAIN WITHOUT SCIATICA: ICD-10-CM

## 2025-01-21 DIAGNOSIS — H61.21 EXCESSIVE CERUMEN IN RIGHT EAR CANAL: ICD-10-CM

## 2025-01-21 PROCEDURE — 1036F TOBACCO NON-USER: CPT | Performed by: NURSE PRACTITIONER

## 2025-01-21 PROCEDURE — 3008F BODY MASS INDEX DOCD: CPT | Performed by: NURSE PRACTITIONER

## 2025-01-21 PROCEDURE — 99214 OFFICE O/P EST MOD 30 MIN: CPT | Performed by: NURSE PRACTITIONER

## 2025-01-21 PROCEDURE — 99395 PREV VISIT EST AGE 18-39: CPT | Performed by: NURSE PRACTITIONER

## 2025-01-21 RX ORDER — TIZANIDINE 4 MG/1
4 TABLET ORAL NIGHTLY PRN
Qty: 20 TABLET | Refills: 0 | Status: SHIPPED | OUTPATIENT
Start: 2025-01-21

## 2025-01-21 RX ORDER — METHYLPREDNISOLONE 4 MG/1
TABLET ORAL
Qty: 21 TABLET | Refills: 0 | Status: SHIPPED | OUTPATIENT
Start: 2025-01-21 | End: 2025-01-27

## 2025-01-21 RX ORDER — GABAPENTIN 100 MG/1
100 CAPSULE ORAL 2 TIMES DAILY
Qty: 60 CAPSULE | Refills: 5 | Status: SHIPPED | OUTPATIENT
Start: 2025-01-21 | End: 2025-07-20

## 2025-01-21 ASSESSMENT — ENCOUNTER SYMPTOMS
ALLERGIC/IMMUNOLOGIC NEGATIVE: 1
FACIAL ASYMMETRY: 0
NERVOUS/ANXIOUS: 0
RESPIRATORY NEGATIVE: 1
EYES NEGATIVE: 1
ENDOCRINE NEGATIVE: 1
LIGHT-HEADEDNESS: 0
GASTROINTESTINAL NEGATIVE: 1
POLYPHAGIA: 0
HEMATOLOGIC/LYMPHATIC NEGATIVE: 1
CARDIOVASCULAR NEGATIVE: 1
CONFUSION: 0
WEAKNESS: 0
DIZZINESS: 0
WOUND: 0
SLEEP DISTURBANCE: 1
CONSTITUTIONAL NEGATIVE: 1
BACK PAIN: 1

## 2025-01-21 NOTE — LETTER
January 21, 2025     Patient: Ezra Cox   YOB: 1995   Date of Visit: 1/21/2025       To Whom It May Concern:    Ezra Cox was seen in my clinic on 1/21/2025 at 8:00 am. Please excuse Ezra for her absence from work on this day to make the appointment. Of note; due to an acute inflammation of her back should not return to work until Thursday, January 23, 2025    If you have any questions or concerns, please don't hesitate to call.         Sincerely,         Brigette Johnson, APRN-CNP        CC: No Recipients
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
## 2025-01-21 NOTE — LETTER
January 21, 2025     No Recipients    Patient: Ezra Cox   YOB: 1995   Date of Visit: 1/21/2025       Dear Dr. Britt Recipients:    Thank you for referring Ezra Cxo to me for evaluation. Below are the relevant portions of my assessment and plan of care.    Assessment / Plan:  1. Acute left-sided low back pain without sciatica  ***  - tiZANidine (Zanaflex) 4 mg tablet; Take 1 tablet (4 mg) by mouth as needed at bedtime for muscle spasms (low back spasms).  Dispense: 20 tablet; Refill: 0  - gabapentin (Neurontin) 100 mg capsule; Take 1 capsule (100 mg) by mouth 2 times a day. Take 1 capsule 100 mg at bedtime x 7 days then increase 100 mg two times daily  Dispense: 60 capsule; Refill: 5  - methylPREDNISolone (Medrol Dospak) 4 mg tablets; Take as directed on package.  Dispense: 21 tablet; Refill: 0    2. Annual physical exam (Primary)  - Hemoglobin A1C; Future  - CBC; Future  - Comprehensive Metabolic Panel; Future  - Lipid Panel; Future  - TSH with reflex to Free T4 if abnormal; Future  - Vitamin D 25-Hydroxy,Total (for eval of Vitamin D levels); Future    3. Snoring  - In-Center Sleep Study (Non-Sleep Provider Only); Future    4. Excessive daytime sleepiness  - In-Center Sleep Study (Non-Sleep Provider Only); Future    5. BMI 50.0-59.9, adult (Multi)  - In-Center Sleep Study (Non-Sleep Provider Only); Future    6. Excessive cerumen in right ear canal  - carbamide peroxide (Debrox) 6.5 % otic solution; Administer 5 drops into the right ear 2 times a day for 4 days.  Dispense: 15 mL; Refill: 0           If you have questions, please do not hesitate to call me. I look forward to following Ezra along with you.         Sincerely,        Brigette Johnson, APRN-CNP        CC: No Recipients

## 2025-02-21 ENCOUNTER — APPOINTMENT (OUTPATIENT)
Dept: PRIMARY CARE | Facility: CLINIC | Age: 30
End: 2025-02-21
Payer: COMMERCIAL

## 2025-03-07 ENCOUNTER — OFFICE VISIT (OUTPATIENT)
Dept: OBSTETRICS AND GYNECOLOGY | Facility: CLINIC | Age: 30
End: 2025-03-07
Payer: COMMERCIAL

## 2025-03-07 VITALS
SYSTOLIC BLOOD PRESSURE: 138 MMHG | WEIGHT: 293 LBS | HEIGHT: 68 IN | DIASTOLIC BLOOD PRESSURE: 66 MMHG | BODY MASS INDEX: 44.41 KG/M2

## 2025-03-07 DIAGNOSIS — M54.50 ACUTE LOW BACK PAIN, UNSPECIFIED BACK PAIN LATERALITY, UNSPECIFIED WHETHER SCIATICA PRESENT: ICD-10-CM

## 2025-03-07 DIAGNOSIS — R39.9 UTI SYMPTOMS: Primary | ICD-10-CM

## 2025-03-07 PROCEDURE — 3008F BODY MASS INDEX DOCD: CPT | Performed by: OBSTETRICS & GYNECOLOGY

## 2025-03-07 PROCEDURE — 99214 OFFICE O/P EST MOD 30 MIN: CPT | Performed by: OBSTETRICS & GYNECOLOGY

## 2025-03-07 PROCEDURE — 1036F TOBACCO NON-USER: CPT | Performed by: OBSTETRICS & GYNECOLOGY

## 2025-03-07 RX ORDER — SULFAMETHOXAZOLE AND TRIMETHOPRIM 800; 160 MG/1; MG/1
1 TABLET ORAL 2 TIMES DAILY
Qty: 6 TABLET | Refills: 1 | Status: SHIPPED | OUTPATIENT
Start: 2025-03-07

## 2025-03-07 ASSESSMENT — ENCOUNTER SYMPTOMS
CONSTITUTIONAL NEGATIVE: 0
HEMATOLOGIC/LYMPHATIC NEGATIVE: 0
ENDOCRINE NEGATIVE: 0
FREQUENCY: 1
CARDIOVASCULAR NEGATIVE: 0
MUSCULOSKELETAL NEGATIVE: 0
ALLERGIC/IMMUNOLOGIC NEGATIVE: 0
GASTROINTESTINAL NEGATIVE: 0
RESPIRATORY NEGATIVE: 0
PSYCHIATRIC NEGATIVE: 0
BACK PAIN: 1
EYES NEGATIVE: 0
NEUROLOGICAL NEGATIVE: 0

## 2025-03-07 ASSESSMENT — PAIN SCALES - GENERAL: PAINLEVEL_OUTOF10: 0-NO PAIN

## 2025-03-07 NOTE — PROGRESS NOTES
Subjective   Patient ID: Ezra Cox is a 29 y.o. female who presents for Urinary Symptom (Patient here for frequent urination and back pain last pap 12/20/24 wnl HPV negative no pain or falls, no other complaints or concerns no chaperone needed).  HPI  Patient complaining of 2-day history of low back pain urinary frequency.  No dysuria or hematuria.  No flank pain.  No nausea vomiting fever chills.  Has been taking cranberry juice.  Has had this issue multiple times before.  Review of chart show that she has had several negative urine cultures at this time.  Patient is sexually active occasionally not using contraception.    Review of Systems   Genitourinary:  Positive for frequency.   Musculoskeletal:  Positive for back pain.   All other systems reviewed and are negative.    Objective   Physical Exam  Vitals reviewed.   Constitutional:       Appearance: Normal appearance. She is obese.   Cardiovascular:      Rate and Rhythm: Normal rate and regular rhythm.   Pulmonary:      Effort: Pulmonary effort is normal.      Breath sounds: Normal breath sounds.   Abdominal:      General: Bowel sounds are normal. There is no distension.      Palpations: Abdomen is soft. There is no mass.      Tenderness: There is no abdominal tenderness.   Genitourinary:     Comments: External genitalia unremarkable  Vagina clear  Cervix closed uterus indefinite hard to palpate patient's habitus  No adnexal masses  Perineum without lesions or swelling  Neurological:      General: No focal deficit present.      Mental Status: She is alert.   Psychiatric:         Mood and Affect: Mood normal.         Behavior: Behavior normal.         Thought Content: Thought content normal.         Judgment: Judgment normal.     Assessment/Plan   Diagnoses and all orders for this visit:  UTI symptoms  -     Urine culture  -     sulfamethoxazole-trimethoprim (Bactrim DS) 800-160 mg tablet; Take 1 tablet by mouth 2 times a day.  Acute low back pain,  unspecified back pain laterality, unspecified whether sciatica present  -     US PELVIS TRANSABDOMINAL WITH TRANSVAGINAL; Future     Patient with a 2-day history of lower back pain suprapubic pressure urinary frequency.  No dysuria or hematuria.  Appear to be UTI symptoms.  Patient has had this several times before with negative cultures.  Did not have her ultrasound done which was ordered in November 2024..  Exam is difficult due to patient's size.  Have reordered pelvic ultrasound.  Urine culture sent UA today is unremarkable.  Patient convinced she has a UTI.  Discussed differential diagnosis with patient.  Issues may not be due to infection.  Could be due to compression by internal structures or bladder spasm.  Will start on Bactrim as a precaution.  Patient should return to office in 2 weeks for reevaluation.  Consider referral to GYN urology workup is negative.  30-minute total visit over 50% use for consultation discussion    Norman Cunha MD 03/07/25 8:24 AM

## 2025-03-09 LAB — BACTERIA UR CULT: NORMAL

## 2025-03-11 ENCOUNTER — TELEPHONE (OUTPATIENT)
Dept: OBSTETRICS AND GYNECOLOGY | Facility: CLINIC | Age: 30
End: 2025-03-11
Payer: COMMERCIAL

## 2025-03-11 ENCOUNTER — HOSPITAL ENCOUNTER (OUTPATIENT)
Dept: RADIOLOGY | Facility: HOSPITAL | Age: 30
Discharge: HOME | End: 2025-03-11
Payer: COMMERCIAL

## 2025-03-11 DIAGNOSIS — R39.9 UTI SYMPTOMS: Primary | ICD-10-CM

## 2025-03-11 DIAGNOSIS — M54.50 ACUTE LOW BACK PAIN, UNSPECIFIED BACK PAIN LATERALITY, UNSPECIFIED WHETHER SCIATICA PRESENT: ICD-10-CM

## 2025-03-11 PROCEDURE — 76830 TRANSVAGINAL US NON-OB: CPT | Performed by: STUDENT IN AN ORGANIZED HEALTH CARE EDUCATION/TRAINING PROGRAM

## 2025-03-11 PROCEDURE — 76830 TRANSVAGINAL US NON-OB: CPT

## 2025-03-11 PROCEDURE — 76856 US EXAM PELVIC COMPLETE: CPT | Performed by: STUDENT IN AN ORGANIZED HEALTH CARE EDUCATION/TRAINING PROGRAM

## 2025-03-11 NOTE — TELEPHONE ENCOUNTER
THIS PATIENT IS REQUESTING URINE LAB ORDER TO THE URINE CHECK AGAIN IF POSSIBLE, SHE STATED SHE COMPLETED THE MEDICATION THAT WAS PRESCRIBED BY DR. MICHAELS ON FRIDAY 03/07/2025.    THE PATIENT IS STILL HAVING SYMPTOMS AND WANTED TO HAVE ANOTHER URINE TEST DONE.       THANK YOU

## 2025-03-12 LAB
25(OH)D3+25(OH)D2 SERPL-MCNC: 16 NG/ML (ref 30–100)
ALBUMIN SERPL-MCNC: 4.2 G/DL (ref 3.6–5.1)
ALP SERPL-CCNC: 40 U/L (ref 31–125)
ALT SERPL-CCNC: 26 U/L (ref 6–29)
ANION GAP SERPL CALCULATED.4IONS-SCNC: 9 MMOL/L (CALC) (ref 7–17)
AST SERPL-CCNC: 31 U/L (ref 10–30)
BILIRUB SERPL-MCNC: 0.4 MG/DL (ref 0.2–1.2)
BUN SERPL-MCNC: 8 MG/DL (ref 7–25)
CALCIUM SERPL-MCNC: 9.4 MG/DL (ref 8.6–10.2)
CHLORIDE SERPL-SCNC: 103 MMOL/L (ref 98–110)
CHOLEST SERPL-MCNC: 215 MG/DL
CHOLEST/HDLC SERPL: 2.4 (CALC)
CO2 SERPL-SCNC: 23 MMOL/L (ref 20–32)
CREAT SERPL-MCNC: 0.81 MG/DL (ref 0.5–0.96)
EGFRCR SERPLBLD CKD-EPI 2021: 101 ML/MIN/1.73M2
ERYTHROCYTE [DISTWIDTH] IN BLOOD BY AUTOMATED COUNT: 17.1 % (ref 11–15)
EST. AVERAGE GLUCOSE BLD GHB EST-MCNC: 114 MG/DL
EST. AVERAGE GLUCOSE BLD GHB EST-SCNC: 6.3 MMOL/L
GLUCOSE SERPL-MCNC: 85 MG/DL (ref 65–99)
HBA1C MFR BLD: 5.6 % OF TOTAL HGB
HCT VFR BLD AUTO: 34.7 % (ref 35–45)
HDLC SERPL-MCNC: 89 MG/DL
HGB BLD-MCNC: 10 G/DL (ref 11.7–15.5)
LDLC SERPL CALC-MCNC: 112 MG/DL (CALC)
MCH RBC QN AUTO: 21.7 PG (ref 27–33)
MCHC RBC AUTO-ENTMCNC: 28.8 G/DL (ref 32–36)
MCV RBC AUTO: 75.3 FL (ref 80–100)
NONHDLC SERPL-MCNC: 126 MG/DL (CALC)
PLATELET # BLD AUTO: 326 THOUSAND/UL (ref 140–400)
PMV BLD REES-ECKER: 9.9 FL (ref 7.5–12.5)
POTASSIUM SERPL-SCNC: 4 MMOL/L (ref 3.5–5.3)
PROT SERPL-MCNC: 7.1 G/DL (ref 6.1–8.1)
RBC # BLD AUTO: 4.61 MILLION/UL (ref 3.8–5.1)
SODIUM SERPL-SCNC: 135 MMOL/L (ref 135–146)
TRIGL SERPL-MCNC: 57 MG/DL
TSH SERPL-ACNC: 1.84 MIU/L
WBC # BLD AUTO: 6.4 THOUSAND/UL (ref 3.8–10.8)

## 2025-03-12 NOTE — RESULT ENCOUNTER NOTE
Normal ultrasound with patient with chronic urinary symptoms.  Please set up telehealth visit to discuss results and decide on management

## 2025-03-13 ENCOUNTER — OFFICE VISIT (OUTPATIENT)
Dept: PRIMARY CARE | Facility: CLINIC | Age: 30
End: 2025-03-13
Payer: COMMERCIAL

## 2025-03-13 VITALS
TEMPERATURE: 97.2 F | SYSTOLIC BLOOD PRESSURE: 111 MMHG | WEIGHT: 293 LBS | HEART RATE: 83 BPM | BODY MASS INDEX: 43.4 KG/M2 | HEIGHT: 69 IN | DIASTOLIC BLOOD PRESSURE: 76 MMHG | OXYGEN SATURATION: 98 %

## 2025-03-13 DIAGNOSIS — D50.9 IRON DEFICIENCY ANEMIA, UNSPECIFIED IRON DEFICIENCY ANEMIA TYPE: ICD-10-CM

## 2025-03-13 DIAGNOSIS — K59.00 CONSTIPATION, UNSPECIFIED CONSTIPATION TYPE: ICD-10-CM

## 2025-03-13 DIAGNOSIS — Z32.01 POSITIVE PREGNANCY TEST (HHS-HCC): ICD-10-CM

## 2025-03-13 DIAGNOSIS — R39.9 UTI SYMPTOMS: Primary | ICD-10-CM

## 2025-03-13 DIAGNOSIS — E55.9 VITAMIN D DEFICIENCY: ICD-10-CM

## 2025-03-13 LAB
POC APPEARANCE, URINE: CLEAR
POC BILIRUBIN, URINE: NEGATIVE
POC BLOOD, URINE: NEGATIVE
POC COLOR, URINE: YELLOW
POC GLUCOSE, URINE: NEGATIVE MG/DL
POC KETONES, URINE: NEGATIVE MG/DL
POC LEUKOCYTES, URINE: NEGATIVE
POC NITRITE,URINE: NEGATIVE
POC PH, URINE: 5.5 PH
POC PROTEIN, URINE: NEGATIVE MG/DL
POC SPECIFIC GRAVITY, URINE: 1.01
POC UROBILINOGEN, URINE: 0.2 EU/DL
PREGNANCY TEST URINE, POC: POSITIVE

## 2025-03-13 PROCEDURE — 81003 URINALYSIS AUTO W/O SCOPE: CPT | Performed by: NURSE PRACTITIONER

## 2025-03-13 PROCEDURE — 99214 OFFICE O/P EST MOD 30 MIN: CPT | Performed by: NURSE PRACTITIONER

## 2025-03-13 PROCEDURE — 3008F BODY MASS INDEX DOCD: CPT | Performed by: NURSE PRACTITIONER

## 2025-03-13 PROCEDURE — 81025 URINE PREGNANCY TEST: CPT | Performed by: NURSE PRACTITIONER

## 2025-03-13 RX ORDER — FERROUS GLUCONATE 325 MG
38 TABLET ORAL
Qty: 30 TABLET | Refills: 11 | Status: SHIPPED | OUTPATIENT
Start: 2025-03-13 | End: 2026-03-13

## 2025-03-13 RX ORDER — DOCUSATE SODIUM 100 MG/1
100 CAPSULE, LIQUID FILLED ORAL 2 TIMES DAILY
Qty: 180 CAPSULE | Refills: 3 | Status: SHIPPED | OUTPATIENT
Start: 2025-03-13 | End: 2026-03-13

## 2025-03-13 RX ORDER — ERGOCALCIFEROL 1.25 MG/1
50000 CAPSULE ORAL WEEKLY
Qty: 12 CAPSULE | Refills: 0 | Status: SHIPPED | OUTPATIENT
Start: 2025-03-13 | End: 2025-06-05

## 2025-03-13 ASSESSMENT — ENCOUNTER SYMPTOMS
DEPRESSION: 0
LOSS OF SENSATION IN FEET: 0
OCCASIONAL FEELINGS OF UNSTEADINESS: 0

## 2025-03-13 ASSESSMENT — COLUMBIA-SUICIDE SEVERITY RATING SCALE - C-SSRS
6. HAVE YOU EVER DONE ANYTHING, STARTED TO DO ANYTHING, OR PREPARED TO DO ANYTHING TO END YOUR LIFE?: NO
1. IN THE PAST MONTH, HAVE YOU WISHED YOU WERE DEAD OR WISHED YOU COULD GO TO SLEEP AND NOT WAKE UP?: NO
2. HAVE YOU ACTUALLY HAD ANY THOUGHTS OF KILLING YOURSELF?: NO

## 2025-03-13 NOTE — PROGRESS NOTES
"Subjective   Patient ID: Ezra Cox is a 29 y.o. female who presents for UTI and Follow-up (Pt is here for F/U and having UTI, abdominal pain, back pain, and headaches. ).    HPI   Here to discuss labs and requesting pregnancy testing due to increased hormone  feeling missed period   Wants UTI  testing as well       Review of Systems    Objective   /76   Pulse 83   Temp 36.2 °C (97.2 °F)   Ht 1.74 m (5' 8.5\")   Wt (!) 164 kg (361 lb)   LMP 02/09/2025   SpO2 98%   BMI 54.09 kg/m²     Physical Exam    Assessment/Plan   1. UTI symptoms (Primary)    - POCT Pregnancy, Urine manually resulted  - POCT UA Automated manually resulted    2. Positive pregnancy test ([facility]-HCC)  - [facility] HCG, TOTAL, QN; Future  - [facility] HCG, TOTAL, QN    3. Iron deficiency anemia, unspecified iron deficiency anemia type   ferrous gluconate (Fergon) 324 (38 Fe) mg tablet; Take 1 tablet (38 mg of iron) by mouth once daily with breakfast.  Dispense: 30 tablet; Refill: 11    4. Vitamin D deficiency  ergocalciferol (Vitamin D-2) 1250 mcg (50,000 units) capsule; Take 1 capsule (50,000 Units) by mouth 1 (one) time per week.  Dispense: 12 capsule; Refill: 0    5. Constipation, unspecified constipation type  docusate sodium (Colace) 100 mg capsule; Take 1 capsule (100 mg) by mouth 2 times a day.  Dispense: 180 capsule; Refill: 3         Noted with positive urine testing in office   We will order labs encouraged patient to FU with GYN   "

## 2025-03-17 ENCOUNTER — APPOINTMENT (OUTPATIENT)
Dept: PRIMARY CARE | Facility: CLINIC | Age: 30
End: 2025-03-17
Payer: COMMERCIAL

## 2025-03-21 ENCOUNTER — OFFICE VISIT (OUTPATIENT)
Dept: OBSTETRICS AND GYNECOLOGY | Facility: CLINIC | Age: 30
End: 2025-03-21
Payer: COMMERCIAL

## 2025-03-21 ENCOUNTER — LAB REQUISITION (OUTPATIENT)
Dept: LAB | Facility: HOSPITAL | Age: 30
End: 2025-03-21

## 2025-03-21 ENCOUNTER — APPOINTMENT (OUTPATIENT)
Dept: OBSTETRICS AND GYNECOLOGY | Facility: CLINIC | Age: 30
End: 2025-03-21
Payer: COMMERCIAL

## 2025-03-21 ENCOUNTER — TELEPHONE (OUTPATIENT)
Dept: OBSTETRICS AND GYNECOLOGY | Facility: CLINIC | Age: 30
End: 2025-03-21

## 2025-03-21 ENCOUNTER — HOSPITAL ENCOUNTER (OUTPATIENT)
Dept: RADIOLOGY | Facility: HOSPITAL | Age: 30
Discharge: HOME | End: 2025-03-21
Payer: COMMERCIAL

## 2025-03-21 VITALS
WEIGHT: 293 LBS | SYSTOLIC BLOOD PRESSURE: 124 MMHG | DIASTOLIC BLOOD PRESSURE: 74 MMHG | BODY MASS INDEX: 44.41 KG/M2 | HEIGHT: 68 IN

## 2025-03-21 DIAGNOSIS — O03.9 MISCARRIAGE (HHS-HCC): Primary | ICD-10-CM

## 2025-03-21 DIAGNOSIS — O36.80X0 PREGNANCY WITH INCONCLUSIVE FETAL VIABILITY, NOT APPLICABLE OR UNSPECIFIED: ICD-10-CM

## 2025-03-21 DIAGNOSIS — O36.80X0 PREGNANCY OF UNKNOWN ANATOMIC LOCATION (HHS-HCC): ICD-10-CM

## 2025-03-21 DIAGNOSIS — O36.80X0 PREGNANCY OF UNKNOWN ANATOMIC LOCATION (HHS-HCC): Primary | ICD-10-CM

## 2025-03-21 LAB
ABO GROUP (TYPE) IN BLOOD: NORMAL
ANTIBODY SCREEN: NORMAL
RH FACTOR (ANTIGEN D): NORMAL

## 2025-03-21 PROCEDURE — 86901 BLOOD TYPING SEROLOGIC RH(D): CPT

## 2025-03-21 PROCEDURE — 86900 BLOOD TYPING SEROLOGIC ABO: CPT

## 2025-03-21 PROCEDURE — 3008F BODY MASS INDEX DOCD: CPT | Performed by: STUDENT IN AN ORGANIZED HEALTH CARE EDUCATION/TRAINING PROGRAM

## 2025-03-21 PROCEDURE — 86850 RBC ANTIBODY SCREEN: CPT

## 2025-03-21 PROCEDURE — 99215 OFFICE O/P EST HI 40 MIN: CPT | Performed by: STUDENT IN AN ORGANIZED HEALTH CARE EDUCATION/TRAINING PROGRAM

## 2025-03-21 PROCEDURE — 76801 OB US < 14 WKS SINGLE FETUS: CPT

## 2025-03-21 RX ORDER — MISOPROSTOL 200 UG/1
800 TABLET ORAL ONCE
Qty: 8 TABLET | Refills: 0 | Status: SHIPPED | OUTPATIENT
Start: 2025-03-21 | End: 2025-03-21

## 2025-03-21 ASSESSMENT — ENCOUNTER SYMPTOMS: ABDOMINAL PAIN: 1

## 2025-03-21 ASSESSMENT — PAIN SCALES - GENERAL: PAINLEVEL_OUTOF10: 6

## 2025-03-21 NOTE — PROGRESS NOTES
Subjective   Patient ID: Ezra Cox is a 29 y.o. female who presents for Abdominal Pain (Abdominal pain for 1 week with some back pain; 4 out of 10 on pain scale).  Patient here for abdominal pain in the setting of recent positive pregnancy test.  Patient says that the pain feels like gas though she does endorse some vaginal cramping as well as some brownish discharge.    Abdominal Pain        Review of Systems   Gastrointestinal:  Positive for abdominal pain.   All other systems reviewed and are negative.      Objective   Physical Exam  Vitals reviewed.   Constitutional:       General: She is not in acute distress.     Appearance: She is not ill-appearing.   Pulmonary:      Effort: Pulmonary effort is normal.   Skin:     Coloration: Skin is not pale.   Neurological:      Mental Status: She is alert.         Assessment/Plan   Diagnoses and all orders for this visit:  Pregnancy of unknown anatomic location ([facility])  -     Type And Screen Is this order related to pregnancy or an upcoming surgery? Yes; Where will this surgery/delivery be performed? [facility]; What is the date of the surgery? 11/15/2025; Has this patient ever had a transfusion? No; Has this...; Future  -     CBC; Future  -     Comprehensive Metabolic Panel; Future  -     [facility] HCG, TOTAL, QN; Future  -     [facility] HCG, TOTAL, QN; Future  -     US MAC OB imaging order; Future    Patient here for pregnancy of unknown location.  Laboratory ultrasound evaluation previous ultrasound results reviewed with patient pregnancy was not visualized and there were no adnexal.  Overall this presentation is most consistent with early intrauterine pregnancy though ectopic and miscarriage cannot be excluded.  Counseled patient that recommendation is for presentation to the emergency department for comprehensive evaluation given risk morbidity associated with extrauterine pregnancy/miscarriage.  However patient is unwilling to present to the ED today due to  other commitments.  She is wanting to go to an ultrasound appointment and go to the lab.  Using shared decision making plan will be for laboratory and ultrasound evaluation as an outpatient.  Called radiology ultrasound and OB ultrasound to expedite scheduling an ultrasound appointment as patient will be out of town next week limiting her ability for follow-up.  Patient to follow-up with his provider in 2 weeks but will call with her ultrasound results once they reviewed.       At least 53 minutes was spent on the day of encounter in the professional and overall care of the patient.    Elio Magallanes MD 03/21/25 10:45 AM

## 2025-03-22 LAB
ALBUMIN SERPL-MCNC: 4.2 G/DL (ref 3.6–5.1)
ALP SERPL-CCNC: 49 U/L (ref 31–125)
ALT SERPL-CCNC: 12 U/L (ref 6–29)
ANION GAP SERPL CALCULATED.4IONS-SCNC: 7 MMOL/L (CALC) (ref 7–17)
AST SERPL-CCNC: 15 U/L (ref 10–30)
B-HCG SERPL-ACNC: ABNORMAL MIU/ML
BILIRUB SERPL-MCNC: 0.5 MG/DL (ref 0.2–1.2)
BUN SERPL-MCNC: 7 MG/DL (ref 7–25)
CALCIUM SERPL-MCNC: 9.1 MG/DL (ref 8.6–10.2)
CHLORIDE SERPL-SCNC: 105 MMOL/L (ref 98–110)
CO2 SERPL-SCNC: 25 MMOL/L (ref 20–32)
CREAT SERPL-MCNC: 0.6 MG/DL (ref 0.5–0.96)
EGFRCR SERPLBLD CKD-EPI 2021: 125 ML/MIN/1.73M2
ERYTHROCYTE [DISTWIDTH] IN BLOOD BY AUTOMATED COUNT: 16.6 % (ref 11–15)
GLUCOSE SERPL-MCNC: 79 MG/DL (ref 65–99)
HCT VFR BLD AUTO: 34 % (ref 35–45)
HGB BLD-MCNC: 10.1 G/DL (ref 11.7–15.5)
MCH RBC QN AUTO: 22.1 PG (ref 27–33)
MCHC RBC AUTO-ENTMCNC: 29.7 G/DL (ref 32–36)
MCV RBC AUTO: 74.6 FL (ref 80–100)
PLATELET # BLD AUTO: 329 THOUSAND/UL (ref 140–400)
PMV BLD REES-ECKER: 9.9 FL (ref 7.5–12.5)
POTASSIUM SERPL-SCNC: 4.1 MMOL/L (ref 3.5–5.3)
PROT SERPL-MCNC: 7.4 G/DL (ref 6.1–8.1)
RBC # BLD AUTO: 4.56 MILLION/UL (ref 3.8–5.1)
SODIUM SERPL-SCNC: 137 MMOL/L (ref 135–146)
WBC # BLD AUTO: 6.9 THOUSAND/UL (ref 3.8–10.8)

## 2025-03-23 DIAGNOSIS — O36.80X0 PREGNANCY OF UNKNOWN ANATOMIC LOCATION (HHS-HCC): ICD-10-CM

## 2025-04-08 ENCOUNTER — APPOINTMENT (OUTPATIENT)
Dept: OBSTETRICS AND GYNECOLOGY | Facility: CLINIC | Age: 30
End: 2025-04-08
Payer: COMMERCIAL

## 2025-04-08 ENCOUNTER — OFFICE VISIT (OUTPATIENT)
Dept: PRIMARY CARE | Facility: CLINIC | Age: 30
End: 2025-04-08
Payer: COMMERCIAL

## 2025-04-08 VITALS
SYSTOLIC BLOOD PRESSURE: 123 MMHG | WEIGHT: 293 LBS | HEIGHT: 68 IN | DIASTOLIC BLOOD PRESSURE: 71 MMHG | BODY MASS INDEX: 44.41 KG/M2

## 2025-04-08 DIAGNOSIS — O03.9 MISCARRIAGE (HHS-HCC): ICD-10-CM

## 2025-04-08 DIAGNOSIS — I82.409: Primary | ICD-10-CM

## 2025-04-08 DIAGNOSIS — N93.9 ABNORMAL UTERINE BLEEDING (AUB): Primary | ICD-10-CM

## 2025-04-08 DIAGNOSIS — D50.9 IRON DEFICIENCY ANEMIA, UNSPECIFIED IRON DEFICIENCY ANEMIA TYPE: ICD-10-CM

## 2025-04-08 DIAGNOSIS — I82.432 ACUTE DEEP VEIN THROMBOSIS (DVT) OF POPLITEAL VEIN OF LEFT LOWER EXTREMITY: ICD-10-CM

## 2025-04-08 DIAGNOSIS — D50.0 IRON DEFICIENCY ANEMIA DUE TO CHRONIC BLOOD LOSS: ICD-10-CM

## 2025-04-08 PROCEDURE — 99215 OFFICE O/P EST HI 40 MIN: CPT | Performed by: NURSE PRACTITIONER

## 2025-04-08 PROCEDURE — 3008F BODY MASS INDEX DOCD: CPT | Performed by: STUDENT IN AN ORGANIZED HEALTH CARE EDUCATION/TRAINING PROGRAM

## 2025-04-08 PROCEDURE — 1036F TOBACCO NON-USER: CPT | Performed by: NURSE PRACTITIONER

## 2025-04-08 PROCEDURE — 99215 OFFICE O/P EST HI 40 MIN: CPT | Performed by: STUDENT IN AN ORGANIZED HEALTH CARE EDUCATION/TRAINING PROGRAM

## 2025-04-08 PROCEDURE — 1036F TOBACCO NON-USER: CPT | Performed by: STUDENT IN AN ORGANIZED HEALTH CARE EDUCATION/TRAINING PROGRAM

## 2025-04-08 RX ORDER — APIXABAN 5 MG (74)
10 KIT ORAL 2 TIMES DAILY
COMMUNITY
Start: 2025-04-07 | End: 2025-05-07

## 2025-04-08 RX ORDER — MEDROXYPROGESTERONE ACETATE 10 MG/1
20 TABLET ORAL DAILY
Qty: 180 TABLET | Refills: 3 | Status: SHIPPED | OUTPATIENT
Start: 2025-04-08 | End: 2026-04-08

## 2025-04-08 ASSESSMENT — ENCOUNTER SYMPTOMS
LOSS OF SENSATION IN FEET: 0
OCCASIONAL FEELINGS OF UNSTEADINESS: 0
DEPRESSION: 0

## 2025-04-08 ASSESSMENT — PAIN SCALES - GENERAL: PAINLEVEL_OUTOF10: 8

## 2025-04-08 NOTE — PROGRESS NOTES
Subjective   Patient ID: Ezra Cox is a 29 y.o. female who presents for No chief complaint on file..    HPI   Here  for FU visit was   Of note; she recently had misscarriage     She just came back from spring break   Patient was diagnosed at the [facility] yesterday with a left DVT and discharged on apixaban.    She saw he GYN Today; she is having periods   She was started on oral   daily Provera 20 mg to reduce risk of significant blood loss and need for transfusion with menses while taking apixaban   We have gone over the side effects of this meications   No CP or SOB     Review of Systems   Cardiovascular:  Positive for leg swelling. Negative for chest pain.       Objective   There were no vitals taken for this visit.    Physical Exam  Vitals and nursing note reviewed.   Constitutional:       Appearance: Normal appearance. She is normal weight.   HENT:      Head: Normocephalic.      Nose: Nose normal.      Mouth/Throat:      Mouth: Mucous membranes are moist.   Eyes:      Extraocular Movements: Extraocular movements intact.      Conjunctiva/sclera: Conjunctivae normal.      Pupils: Pupils are equal, round, and reactive to light.   Cardiovascular:      Rate and Rhythm: Normal rate and regular rhythm.      Pulses: Normal pulses.      Heart sounds: Normal heart sounds.   Pulmonary:      Effort: Pulmonary effort is normal.   Abdominal:      General: Abdomen is flat. Bowel sounds are normal.      Palpations: Abdomen is soft.   Musculoskeletal:         General: Normal range of motion.      Cervical back: Normal range of motion.      Left lower leg: Edema present.   Skin:     General: Skin is warm and dry.   Neurological:      General: No focal deficit present.      Mental Status: She is alert and oriented to person, place, and time.   Psychiatric:         Mood and Affect: Mood normal.         Behavior: Behavior normal.         Assessment/Plan   1. Traveling blood clot in leg (Multi) (Primary)  - CBC;  Future  - Iron and TIBC; Future  - Factor V Leiden; Future  - Prothrombin gene mutation; Future  - Homocysteine, serum; Future  - Protein C Activity; Future  - Protein S Antigen, Free; Future  - Anti-Cardiolipin Antibody (IgA, IgG, and IgM); Future  - Lupus Anticoagulant with Interpretation (DRVVT + SCT); Future  - CBC  - Iron and TIBC  - Factor V Leiden  - Prothrombin gene mutation  - Homocysteine, serum  - Protein C Activity  - Protein S Antigen, Free  - Anti-Cardiolipin Antibody (IgA, IgG, and IgM)  - Lupus Anticoagulant with Interpretation (DRVVT + SCT)    2. Iron deficiency anemia, unspecified iron deficiency anemia type  - CBC; Future  - Iron and TIBC; Future  - Factor V Leiden; Future  - Prothrombin gene mutation; Future  - Homocysteine, serum; Future  - Protein C Activity; Future  - Protein S Antigen, Free; Future  - Anti-Cardiolipin Antibody (IgA, IgG, and IgM); Future  - Lupus Anticoagulant with Interpretation (DRVVT + SCT); Future  - CBC  - Iron and TIBC  - Factor V Leiden  - Prothrombin gene mutation  - Homocysteine, serum  - Protein C Activity  - Protein S Antigen, Free  - Anti-Cardiolipin Antibody (IgA, IgG, and IgM)  - Lupus Anticoagulant with Interpretation (DRVVT + SCT)    3. Miscarriage ([facility]HS-HCC)  - CBC; Future  - Iron and TIBC; Future  - Factor V Leiden; Future  - Prothrombin gene mutation; Future  - Homocysteine, serum; Future  - Protein C Activity; Future  - Protein S Antigen, Free; Future  - Anti-Cardiolipin Antibody (IgA, IgG, and IgM); Future  - Lupus Anticoagulant with Interpretation (DRVVT + SCT); Future  - CBC  - Iron and TIBC  - Factor V Leiden  - Prothrombin gene mutation  - Homocysteine, serum  - Protein C Activity  - Protein S Antigen, Free  - Anti-Cardiolipin Antibody (IgA, IgG, and IgM)  - Lupus Anticoagulant with Interpretation (DRVVT + SCT)      FU in 3 month for repeat US

## 2025-04-08 NOTE — PROGRESS NOTES
Subjective   Patient ID: Ezra Cox is a 29 y.o. female who presents for Follow-up (Pt is here for her follow up appointment to go over her Ultrasound results. Pt is concerned about the blood clot that is in her left leg./Pain is a 8/No fall/Pt declined chaperone/[facility]).  Patient here for follow-up.  Ultrasound results with no gestational sac.  Patient was diagnosed at the [facility] yesterday with a left DVT and discharged on apixaban.  She is hesitant to continue this medication for a long period of time.  She does report heavy periods.        Review of Systems   All other systems reviewed and are negative.      Objective   Physical Exam  Vitals reviewed.   Constitutional:       General: She is not in acute distress.     Appearance: She is not ill-appearing.   Pulmonary:      Effort: Pulmonary effort is normal.   Skin:     Coloration: Skin is not pale.   Neurological:      Mental Status: She is alert.         Assessment/Plan   Diagnoses and all orders for this visit:  Abnormal uterine bleeding (AUB)  -     medroxyPROGESTERone (Provera) 10 mg tablet; Take 2 tablets (20 mg) by mouth once daily.  Acute deep vein thrombosis (DVT) of popliteal vein of left lower extremity    Patient here for follow-up.  Ultrasound results reviewed with patient no evidence of retained products of conception.  Patient does report new diagnosis of DVT diagnosed at the [facility] yesterday.  Hemoglobin was 8.1.  She was discharged on apixaban with a plan to be on this medication for several months.  Counseled patient in detail about the physiology of hemostasis as well as the pathophysiology and possible complications of deep vein thrombosis including pulmonary embolism and death.  Given comorbid abnormal uterine bleeding complicated by iron deficiency anemia, commend continued oral iron supplementation as well as starting progestin therapy.  Offered patient oral versus intrauterine versus intramuscular progestin.  Patient  prefers oral.  Will send prescription for daily Provera 20 mg to reduce risk of significant blood loss and need for transfusion with menses while taking apixaban.  Patient expressed understanding all questions were asked and answered.  Will patient follow-up in 1 month.       Elio Magallanes MD 04/08/25 12:38 PM

## 2025-05-06 ENCOUNTER — APPOINTMENT (OUTPATIENT)
Dept: OBSTETRICS AND GYNECOLOGY | Facility: CLINIC | Age: 30
End: 2025-05-06
Payer: COMMERCIAL

## 2025-05-06 VITALS
SYSTOLIC BLOOD PRESSURE: 120 MMHG | DIASTOLIC BLOOD PRESSURE: 80 MMHG | WEIGHT: 293 LBS | HEIGHT: 68 IN | BODY MASS INDEX: 44.41 KG/M2

## 2025-05-06 DIAGNOSIS — N93.9 ABNORMAL UTERINE BLEEDING (AUB): ICD-10-CM

## 2025-05-06 DIAGNOSIS — Z11.3 SCREEN FOR STD (SEXUALLY TRANSMITTED DISEASE): Primary | ICD-10-CM

## 2025-05-06 LAB
BILIRUBIN, POC: NEGATIVE
BLOOD URINE, POC: NEGATIVE
CLARITY, POC: CLEAR
COLOR, POC: YELLOW
GLUCOSE URINE, POC: NEGATIVE
KETONES, POC: NEGATIVE
LEUKOCYTE EST, POC: NEGATIVE
NITRITE, POC: NEGATIVE
PH, POC: 6
SPECIFIC GRAVITY, POC: 1.01
URINE PROTEIN, POC: NEGATIVE
UROBILINOGEN, POC: 0.2

## 2025-05-06 PROCEDURE — 3008F BODY MASS INDEX DOCD: CPT | Performed by: STUDENT IN AN ORGANIZED HEALTH CARE EDUCATION/TRAINING PROGRAM

## 2025-05-06 PROCEDURE — 99213 OFFICE O/P EST LOW 20 MIN: CPT | Performed by: STUDENT IN AN ORGANIZED HEALTH CARE EDUCATION/TRAINING PROGRAM

## 2025-05-06 PROCEDURE — 81002 URINALYSIS NONAUTO W/O SCOPE: CPT | Performed by: STUDENT IN AN ORGANIZED HEALTH CARE EDUCATION/TRAINING PROGRAM

## 2025-05-06 ASSESSMENT — PAIN SCALES - GENERAL: PAINLEVEL_OUTOF10: 0-NO PAIN

## 2025-05-06 NOTE — PROGRESS NOTES
Subjective   Patient ID: Ezra Cox is a 30 y.o. female who presents for Follow-up (Pt is here for follow up regarding medication./Patient requests urine to be checked for STD's and UTI./Declines chaperone.).  Patient here for follow-up.  Patient with known AUB recently started anticoagulation for leg DVT.  Was prescribed Provera.  Patient reports that her most recent menses was actually shorter less heavy than previous menses.  She says that this was not while taking the Provera.      Review of Systems   All other systems reviewed and are negative.      Objective   Physical Exam  Vitals reviewed.   Constitutional:       General: She is not in acute distress.     Appearance: She is not ill-appearing.   Pulmonary:      Effort: Pulmonary effort is normal.   Skin:     Coloration: Skin is not pale.   Neurological:      Mental Status: She is alert.         Assessment/Plan   Diagnoses and all orders for this visit:  Screen for STD (sexually transmitted disease)  -     Trichomonas vaginalis, Amplified  -     C. trachomatis / N. gonorrhoeae, Amplified, Urogenital  -     POCT urinalysis dipstick manually resulted  Abnormal uterine bleeding (AUB)    Patient here for follow-up of abnormal uterine bleeding in the setting of anticoagulation.  Patient reports that her menses are more watery but so far less heavy than prior.  She has not been taking Provera.  Patient was instructed to start taking Provera if her periods ever become heavier than they were prior.  Patient to follow-up in 3 months or as needed.       Elio Magallanes MD 05/06/25 1:49 PM

## 2025-05-07 LAB
C TRACH RRNA SPEC QL NAA+PROBE: NOT DETECTED
N GONORRHOEA RRNA SPEC QL NAA+PROBE: NOT DETECTED
QUEST GC CT AMPLIFIED (ALWAYS MESSAGE): NORMAL
T VAGINALIS RRNA SPEC QL NAA+PROBE: NOT DETECTED

## 2025-05-16 ENCOUNTER — APPOINTMENT (OUTPATIENT)
Dept: SLEEP MEDICINE | Facility: CLINIC | Age: 30
End: 2025-05-16
Payer: COMMERCIAL

## 2025-06-03 ENCOUNTER — TELEPHONE (OUTPATIENT)
Dept: PRIMARY CARE | Facility: CLINIC | Age: 30
End: 2025-06-03
Payer: COMMERCIAL

## 2025-06-03 NOTE — TELEPHONE ENCOUNTER
Pt called she said the US order for Vascular  extremity she said she wants to get it sooner but when she went to schedule they told her the order said she can't because the date was put in and needs to be changed.  Please advise and thank you

## 2025-06-03 NOTE — TELEPHONE ENCOUNTER
US order is showing to be done in July- Were you wanting the patient to wait to get it done in that time frame? Checking on this before we do anything.

## 2025-06-06 ENCOUNTER — OFFICE VISIT (OUTPATIENT)
Dept: OBSTETRICS AND GYNECOLOGY | Facility: CLINIC | Age: 30
End: 2025-06-06
Payer: COMMERCIAL

## 2025-06-06 VITALS — BODY MASS INDEX: 54.01 KG/M2 | SYSTOLIC BLOOD PRESSURE: 109 MMHG | DIASTOLIC BLOOD PRESSURE: 69 MMHG | WEIGHT: 293 LBS

## 2025-06-06 DIAGNOSIS — R39.9 UTI SYMPTOMS: Primary | ICD-10-CM

## 2025-06-06 DIAGNOSIS — N89.8 VAGINAL DISCHARGE: ICD-10-CM

## 2025-06-06 PROCEDURE — 99214 OFFICE O/P EST MOD 30 MIN: CPT | Performed by: STUDENT IN AN ORGANIZED HEALTH CARE EDUCATION/TRAINING PROGRAM

## 2025-06-06 PROCEDURE — 1036F TOBACCO NON-USER: CPT | Performed by: STUDENT IN AN ORGANIZED HEALTH CARE EDUCATION/TRAINING PROGRAM

## 2025-06-06 RX ORDER — SULFAMETHOXAZOLE AND TRIMETHOPRIM 800; 160 MG/1; MG/1
1 TABLET ORAL 2 TIMES DAILY
Qty: 10 TABLET | Refills: 0 | Status: SHIPPED | OUTPATIENT
Start: 2025-06-06 | End: 2025-06-11

## 2025-06-06 ASSESSMENT — ENCOUNTER SYMPTOMS: FREQUENCY: 1

## 2025-06-06 NOTE — PROGRESS NOTES
Subjective   Patient ID: Ezra Cox is a 30 y.o. female who presents for Urinary Frequency (Patient states she noticed two days ago that she has not been able to completley empty her bladder, she has yellow discharge.).  Patient complaining of urinary frequency sensation of incomplete emptying and nocturia.  Patient is also complaining of abnormal vaginal discharge.    Urinary Frequency   Associated symptoms include frequency.       Review of Systems   Genitourinary:  Positive for frequency and vaginal discharge.       Objective   Physical Exam  Vitals reviewed. Exam conducted with a chaperone present.   Constitutional:       General: She is not in acute distress.     Appearance: She is not ill-appearing.   Pulmonary:      Effort: Pulmonary effort is normal.   Genitourinary:     General: Normal vulva.      Exam position: Lithotomy position.      Vagina: No signs of injury and foreign body. No vaginal discharge, erythema, tenderness, bleeding, lesions or prolapsed vaginal walls.      Cervix: No discharge, friability, lesion, erythema or cervical bleeding.   Neurological:      Mental Status: She is alert.         Assessment/Plan   Diagnoses and all orders for this visit:  UTI symptoms  -     sulfamethoxazole-trimethoprim (Bactrim DS) 800-160 mg tablet; Take 1 tablet by mouth 2 times a day for 5 days.  -     Urine culture  Vaginal discharge  -     Trichomonas vaginalis, Amplified  -     C. trachomatis / N. gonorrhoeae, Amplified, Urogenital  -     Vaginitis Gram Stain For Bacterial Vaginosis + Yeast    Patient here for evaluation of UTI symptoms.  Will treat empirically with Bactrim urine culture.  Will follow-up swabs and treat as indicated.  Impression is of no vaginitis at this time.       Elio Magallanes MD 06/06/25 4:37 PM

## 2025-06-08 LAB
BACTERIA UR CULT: NORMAL
BV SCORE VAG QL: NORMAL
C TRACH RRNA SPEC QL NAA+PROBE: NOT DETECTED
N GONORRHOEA RRNA SPEC QL NAA+PROBE: NOT DETECTED
QUEST GC CT AMPLIFIED (ALWAYS MESSAGE): NORMAL
T VAGINALIS RRNA SPEC QL NAA+PROBE: NOT DETECTED

## 2025-06-09 ENCOUNTER — TELEPHONE (OUTPATIENT)
Dept: PRIMARY CARE | Facility: CLINIC | Age: 30
End: 2025-06-09
Payer: COMMERCIAL

## 2025-06-09 DIAGNOSIS — R14.0 ABDOMINAL DISTENTION: Primary | ICD-10-CM

## 2025-06-09 DIAGNOSIS — R35.0 INCREASED URINARY FREQUENCY: ICD-10-CM

## 2025-06-09 NOTE — TELEPHONE ENCOUNTER
Pt called by this provider   States she worsening urinary s/s was seen at gyn   States that she having worsening Urinary frequency   Did have some discharge   Urine testing negative   Concerned for diabetic she would like labs checked of note has been drinking more caffeine then usual due to her work schedule  I have discussed to stop all oral consumption of liquids 1-2 hrs prior to bedtime; and avoid caffeine 4 hrs before bedtime as well       We will complete labs and KUB For follow up

## 2025-06-11 ENCOUNTER — HOSPITAL ENCOUNTER (OUTPATIENT)
Dept: RADIOLOGY | Facility: CLINIC | Age: 30
Discharge: HOME | End: 2025-06-11
Payer: COMMERCIAL

## 2025-06-11 DIAGNOSIS — I82.409: ICD-10-CM

## 2025-06-11 PROCEDURE — 93970 EXTREMITY STUDY: CPT

## 2025-06-11 PROCEDURE — 93971 EXTREMITY STUDY: CPT | Performed by: RADIOLOGY

## 2025-06-12 DIAGNOSIS — I82.409: Primary | ICD-10-CM

## 2025-06-13 DIAGNOSIS — I82.4Y2 ACUTE DEEP VEIN THROMBOSIS (DVT) OF PROXIMAL VEIN OF LEFT LOWER EXTREMITY: Primary | ICD-10-CM

## 2025-06-20 ENCOUNTER — APPOINTMENT (OUTPATIENT)
Dept: PRIMARY CARE | Facility: CLINIC | Age: 30
End: 2025-06-20
Payer: COMMERCIAL

## 2025-07-02 ENCOUNTER — HOSPITAL ENCOUNTER (OUTPATIENT)
Dept: RADIOLOGY | Facility: HOSPITAL | Age: 30
Discharge: HOME | End: 2025-07-02
Payer: COMMERCIAL

## 2025-07-02 DIAGNOSIS — R14.0 ABDOMINAL DISTENTION: ICD-10-CM

## 2025-07-02 PROCEDURE — 74019 RADEX ABDOMEN 2 VIEWS: CPT

## 2025-07-02 PROCEDURE — 74019 RADEX ABDOMEN 2 VIEWS: CPT | Performed by: RADIOLOGY

## 2025-07-03 ENCOUNTER — APPOINTMENT (OUTPATIENT)
Dept: PRIMARY CARE | Facility: CLINIC | Age: 30
End: 2025-07-03
Payer: COMMERCIAL

## 2025-07-03 VITALS — HEART RATE: 80 BPM | SYSTOLIC BLOOD PRESSURE: 112 MMHG | DIASTOLIC BLOOD PRESSURE: 70 MMHG | OXYGEN SATURATION: 96 %

## 2025-07-03 DIAGNOSIS — K59.00 CONSTIPATION, UNSPECIFIED CONSTIPATION TYPE: ICD-10-CM

## 2025-07-03 DIAGNOSIS — D50.9 IRON DEFICIENCY ANEMIA, UNSPECIFIED IRON DEFICIENCY ANEMIA TYPE: ICD-10-CM

## 2025-07-03 PROCEDURE — 99214 OFFICE O/P EST MOD 30 MIN: CPT | Performed by: NURSE PRACTITIONER

## 2025-07-03 RX ORDER — FERROUS GLUCONATE 325 MG
38 TABLET ORAL
Qty: 90 TABLET | Refills: 3 | Status: SHIPPED | OUTPATIENT
Start: 2025-07-03 | End: 2026-07-03

## 2025-07-03 RX ORDER — SYRING-NEEDL,DISP,INSUL,0.3 ML 29 G X1/2"
296 SYRINGE, EMPTY DISPOSABLE MISCELLANEOUS ONCE
Qty: 296 ML | Refills: 0 | Status: SHIPPED | OUTPATIENT
Start: 2025-07-03 | End: 2025-07-03

## 2025-07-03 NOTE — PROGRESS NOTES
"Subjective   Patient ID: Ezra Cox is a 30 y.o. female who presents for Follow-up (Patient is here for a follow up to discuss labs).    HPI   Here for FU visit   Did not complete recent labs for iron studes   Saw vasadithya needs to s charli on DOAC  Had KUB shows constipation   \" Wants cleaned out\"   We discussed meds to help with chronic michael   Needs to complete diet journal     Review of Systems    Objective   /70 (BP Location: Left arm, Patient Position: Sitting, BP Cuff Size: Adult long)   Pulse 80   LMP 05/24/2025 (Exact Date)   SpO2 96%     Physical Exam    Assessment/Plan   1. Iron deficiency anemia, unspecified iron deficiency anemia type  - ferrous gluconate (Fergon) 324 mg (38 mg elemental) tablet; Take 1 tablet by mouth once daily with breakfast.  Dispense: 90 tablet; Refill: 3    2. Constipation, unspecified constipation type  - magnesium citrate solution; Take 296 mL by mouth 1 time for 1 dose.  Dispense: 296 mL; Refill: 0           "

## 2025-07-14 ENCOUNTER — APPOINTMENT (OUTPATIENT)
Dept: VASCULAR MEDICINE | Facility: CLINIC | Age: 30
End: 2025-07-14
Payer: COMMERCIAL

## 2025-07-15 ENCOUNTER — APPOINTMENT (OUTPATIENT)
Dept: PRIMARY CARE | Facility: CLINIC | Age: 30
End: 2025-07-15
Payer: COMMERCIAL

## 2025-07-24 ENCOUNTER — TELEPHONE (OUTPATIENT)
Dept: OBSTETRICS AND GYNECOLOGY | Facility: CLINIC | Age: 30
End: 2025-07-24
Payer: COMMERCIAL

## 2025-07-24 NOTE — TELEPHONE ENCOUNTER
Returned call to patient, she states she had her period on 7/14 and it lasted for 4-5 days. Patient was instructed to stop her Eliquis on 7/16, she was taking this for hx of blood clots. She states that this week she started having light pink when she wipes. Patient also having concerns of increased vaginal discharge. Discharge is clear with no odor. Patient was seen for this issue in June and testing was negative. Patient now notes today she had a small pink colored clot expel during urination. She denies heavy bleeding and UTI symptoms. She is concerned as she typically has not experienced this in the past with any bleeding after her period. Will discuss this with Dr. Magallanes and return call to patient tomorrow.  Judy Ratliff RN

## 2025-07-24 NOTE — TELEPHONE ENCOUNTER
This patient stated she stop her blood thinners on 07/16/2025 (while currently on her cycle) and since then the patient has been wiping pink colored blood, and  in the last few days she has been having small clots and discharge.      The patient can be reached at [phone].        Thank You

## 2025-07-25 NOTE — TELEPHONE ENCOUNTER
Contacted patient back to discuss provider conversation. LVM instructing patient to call office back. Per Dr. Magallanes-we are happy that she was able to finish out her course of Eliquis. And that unless the spotting/small clot that came out with urination becomes bothersome or becomes an ongoing issue that she can monitor her symptoms at home and call the office if anything worsens.

## 2025-08-01 ENCOUNTER — APPOINTMENT (OUTPATIENT)
Dept: PRIMARY CARE | Facility: CLINIC | Age: 30
End: 2025-08-01
Payer: COMMERCIAL

## 2025-08-06 ENCOUNTER — APPOINTMENT (OUTPATIENT)
Dept: OBSTETRICS AND GYNECOLOGY | Facility: CLINIC | Age: 30
End: 2025-08-06
Payer: COMMERCIAL

## 2025-08-06 VITALS — BODY MASS INDEX: 52.31 KG/M2 | WEIGHT: 293 LBS | SYSTOLIC BLOOD PRESSURE: 112 MMHG | DIASTOLIC BLOOD PRESSURE: 73 MMHG

## 2025-08-06 DIAGNOSIS — N93.9 ABNORMAL UTERINE BLEEDING (AUB): Primary | ICD-10-CM

## 2025-08-06 DIAGNOSIS — I82.432 ACUTE DEEP VEIN THROMBOSIS (DVT) OF POPLITEAL VEIN OF LEFT LOWER EXTREMITY: ICD-10-CM

## 2025-08-06 PROCEDURE — 99213 OFFICE O/P EST LOW 20 MIN: CPT | Performed by: STUDENT IN AN ORGANIZED HEALTH CARE EDUCATION/TRAINING PROGRAM

## 2025-08-06 RX ORDER — SYRING-NEEDL,DISP,INSUL,0.3 ML 29 G X1/2"
296 SYRINGE, EMPTY DISPOSABLE MISCELLANEOUS ONCE
COMMUNITY
Start: 2025-07-28

## 2025-08-06 ASSESSMENT — ENCOUNTER SYMPTOMS: ABDOMINAL PAIN: 0

## 2025-08-06 ASSESSMENT — PAIN SCALES - GENERAL: PAINLEVEL_OUTOF10: 0-NO PAIN

## 2025-08-07 ENCOUNTER — APPOINTMENT (OUTPATIENT)
Dept: PRIMARY CARE | Facility: CLINIC | Age: 30
End: 2025-08-07
Payer: COMMERCIAL

## 2025-08-20 ENCOUNTER — OFFICE VISIT (OUTPATIENT)
Dept: OBSTETRICS AND GYNECOLOGY | Facility: CLINIC | Age: 30
End: 2025-08-20
Payer: COMMERCIAL

## 2025-08-20 VITALS — WEIGHT: 293 LBS | DIASTOLIC BLOOD PRESSURE: 71 MMHG | BODY MASS INDEX: 52.7 KG/M2 | SYSTOLIC BLOOD PRESSURE: 119 MMHG

## 2025-08-20 DIAGNOSIS — B96.89 BV (BACTERIAL VAGINOSIS): ICD-10-CM

## 2025-08-20 DIAGNOSIS — N93.9 ABNORMAL UTERINE BLEEDING (AUB): ICD-10-CM

## 2025-08-20 DIAGNOSIS — N76.0 BV (BACTERIAL VAGINOSIS): ICD-10-CM

## 2025-08-20 DIAGNOSIS — N89.8 VAGINAL DISCHARGE: Primary | ICD-10-CM

## 2025-08-20 PROCEDURE — 1036F TOBACCO NON-USER: CPT | Performed by: STUDENT IN AN ORGANIZED HEALTH CARE EDUCATION/TRAINING PROGRAM

## 2025-08-20 PROCEDURE — 99214 OFFICE O/P EST MOD 30 MIN: CPT | Performed by: STUDENT IN AN ORGANIZED HEALTH CARE EDUCATION/TRAINING PROGRAM

## 2025-08-20 RX ORDER — METRONIDAZOLE 500 MG/1
500 TABLET ORAL 2 TIMES DAILY
Qty: 14 TABLET | Refills: 0 | Status: SHIPPED | OUTPATIENT
Start: 2025-08-20 | End: 2025-08-27

## 2025-08-20 ASSESSMENT — PAIN SCALES - GENERAL: PAINLEVEL_OUTOF10: 0-NO PAIN

## 2025-08-21 LAB
BV SCORE VAG QL: ABNORMAL
C TRACH RRNA SPEC QL NAA+PROBE: NOT DETECTED
N GONORRHOEA RRNA SPEC QL NAA+PROBE: NOT DETECTED
QUEST GC CT AMPLIFIED (ALWAYS MESSAGE): NORMAL
T VAGINALIS RRNA SPEC QL NAA+PROBE: NOT DETECTED

## 2025-08-22 ENCOUNTER — APPOINTMENT (OUTPATIENT)
Dept: PRIMARY CARE | Facility: CLINIC | Age: 30
End: 2025-08-22
Payer: COMMERCIAL

## 2025-08-22 DIAGNOSIS — M79.605 PAIN OF LEFT LOWER EXTREMITY: Primary | ICD-10-CM

## 2025-08-22 DIAGNOSIS — I82.409: ICD-10-CM

## 2025-08-22 DIAGNOSIS — M79.89 SWELLING OF LEFT LOWER EXTREMITY: ICD-10-CM

## 2025-08-22 DIAGNOSIS — R25.2 LEG CRAMPS: ICD-10-CM

## 2025-08-22 PROCEDURE — 99214 OFFICE O/P EST MOD 30 MIN: CPT | Performed by: NURSE PRACTITIONER

## 2025-08-22 RX ORDER — CALCIUM CARBONATE/VITAMIN D3 500-10/5ML
LIQUID (ML) ORAL
Qty: 90 CAPSULE | Refills: 1 | Status: SHIPPED | OUTPATIENT
Start: 2025-08-22

## 2025-08-27 ENCOUNTER — APPOINTMENT (OUTPATIENT)
Dept: RADIOLOGY | Facility: HOSPITAL | Age: 30
End: 2025-08-27
Payer: COMMERCIAL

## 2025-09-04 ENCOUNTER — APPOINTMENT (OUTPATIENT)
Dept: RADIOLOGY | Facility: HOSPITAL | Age: 30
End: 2025-09-04
Payer: COMMERCIAL

## 2025-12-10 ENCOUNTER — APPOINTMENT (OUTPATIENT)
Dept: OBSTETRICS AND GYNECOLOGY | Facility: CLINIC | Age: 30
End: 2025-12-10
Payer: COMMERCIAL

## 2025-12-30 ENCOUNTER — APPOINTMENT (OUTPATIENT)
Dept: OBSTETRICS AND GYNECOLOGY | Facility: CLINIC | Age: 30
End: 2025-12-30
Payer: COMMERCIAL